# Patient Record
Sex: FEMALE | Race: WHITE | NOT HISPANIC OR LATINO | Employment: UNEMPLOYED | URBAN - METROPOLITAN AREA
[De-identification: names, ages, dates, MRNs, and addresses within clinical notes are randomized per-mention and may not be internally consistent; named-entity substitution may affect disease eponyms.]

---

## 2017-01-17 ENCOUNTER — TRANSCRIBE ORDERS (OUTPATIENT)
Dept: ADMINISTRATIVE | Facility: HOSPITAL | Age: 62
End: 2017-01-17

## 2017-01-17 ENCOUNTER — HOSPITAL ENCOUNTER (OUTPATIENT)
Dept: RADIOLOGY | Facility: CLINIC | Age: 62
Discharge: HOME | End: 2017-01-17
Payer: OTHER GOVERNMENT

## 2017-01-17 DIAGNOSIS — M54.42 ACUTE BACK PAIN WITH SCIATICA, LEFT: ICD-10-CM

## 2017-01-17 DIAGNOSIS — M54.42 ACUTE BACK PAIN WITH SCIATICA, LEFT: Primary | ICD-10-CM

## 2017-01-17 PROCEDURE — 72190 X-RAY EXAM OF PELVIS: CPT

## 2017-05-05 ENCOUNTER — TRANSCRIBE ORDERS (OUTPATIENT)
Dept: ADMINISTRATIVE | Facility: HOSPITAL | Age: 62
End: 2017-05-05

## 2017-05-05 DIAGNOSIS — I71.2 THORACIC AORTIC ANEURYSM WITHOUT RUPTURE (HCC): Primary | ICD-10-CM

## 2017-05-25 ENCOUNTER — TRANSCRIBE ORDERS (OUTPATIENT)
Dept: ADMINISTRATIVE | Facility: HOSPITAL | Age: 62
End: 2017-05-25

## 2017-05-25 DIAGNOSIS — K80.30 CALCULUS OF BILE DUCT WITH CHOLANGITIS WITHOUT OBSTRUCTION, UNSPECIFIED CHOLANGITIS ACUITY: Primary | ICD-10-CM

## 2017-06-13 ENCOUNTER — HOSPITAL ENCOUNTER (OUTPATIENT)
Dept: CT IMAGING | Facility: HOSPITAL | Age: 62
Discharge: HOME | End: 2017-06-13
Payer: OTHER GOVERNMENT

## 2017-06-13 DIAGNOSIS — I71.2 THORACIC AORTIC ANEURYSM WITHOUT RUPTURE (HCC): ICD-10-CM

## 2017-06-13 DIAGNOSIS — K80.30 CALCULUS OF BILE DUCT WITH CHOLANGITIS WITHOUT OBSTRUCTION, UNSPECIFIED CHOLANGITIS ACUITY: ICD-10-CM

## 2017-06-13 PROCEDURE — 74160 CT ABDOMEN W/CONTRAST: CPT

## 2017-06-13 PROCEDURE — 71275 CT ANGIOGRAPHY CHEST: CPT

## 2017-06-13 RX ADMIN — IOHEXOL 100 ML: 350 INJECTION, SOLUTION INTRAVENOUS at 09:01

## 2017-06-20 ENCOUNTER — TRANSCRIBE ORDERS (OUTPATIENT)
Dept: ADMINISTRATIVE | Facility: HOSPITAL | Age: 62
End: 2017-06-20

## 2017-06-20 DIAGNOSIS — K80.50 CALCULUS OF BILE DUCT WITHOUT MENTION OF CHOLECYSTITIS OR OBSTRUCTION: Primary | ICD-10-CM

## 2017-06-26 ENCOUNTER — HOSPITAL ENCOUNTER (OUTPATIENT)
Dept: MRI IMAGING | Facility: HOSPITAL | Age: 62
Discharge: HOME | End: 2017-06-26
Attending: FAMILY MEDICINE
Payer: OTHER GOVERNMENT

## 2017-06-26 DIAGNOSIS — K80.50 CALCULUS OF BILE DUCT WITHOUT MENTION OF CHOLECYSTITIS OR OBSTRUCTION: ICD-10-CM

## 2017-06-26 PROCEDURE — 74181 MRI ABDOMEN W/O CONTRAST: CPT

## 2017-06-26 PROCEDURE — 76376 3D RENDER W/INTRP POSTPROCES: CPT

## 2017-11-17 ENCOUNTER — TRANSCRIBE ORDERS (OUTPATIENT)
Dept: ADMINISTRATIVE | Facility: HOSPITAL | Age: 62
End: 2017-11-17

## 2017-11-17 DIAGNOSIS — Z78.0 MENOPAUSE: Primary | ICD-10-CM

## 2017-12-08 ENCOUNTER — HOSPITAL ENCOUNTER (OUTPATIENT)
Dept: BONE DENSITY | Facility: IMAGING CENTER | Age: 62
Discharge: HOME | End: 2017-12-08
Payer: OTHER GOVERNMENT

## 2017-12-08 ENCOUNTER — GENERIC CONVERSION - ENCOUNTER (OUTPATIENT)
Dept: OTHER | Facility: OTHER | Age: 62
End: 2017-12-08

## 2017-12-08 DIAGNOSIS — Z78.0 MENOPAUSE: ICD-10-CM

## 2017-12-08 PROCEDURE — 77080 DXA BONE DENSITY AXIAL: CPT

## 2019-01-22 ENCOUNTER — OFFICE VISIT (OUTPATIENT)
Dept: CARDIOLOGY CLINIC | Facility: CLINIC | Age: 64
End: 2019-01-22
Payer: OTHER GOVERNMENT

## 2019-01-22 VITALS
SYSTOLIC BLOOD PRESSURE: 112 MMHG | HEIGHT: 64 IN | BODY MASS INDEX: 23.52 KG/M2 | DIASTOLIC BLOOD PRESSURE: 62 MMHG | WEIGHT: 137.8 LBS | HEART RATE: 68 BPM

## 2019-01-22 DIAGNOSIS — R06.00 DYSPNEA, UNSPECIFIED TYPE: ICD-10-CM

## 2019-01-22 DIAGNOSIS — R00.2 PALPITATIONS: Primary | ICD-10-CM

## 2019-01-22 DIAGNOSIS — E78.5 DYSLIPIDEMIA: ICD-10-CM

## 2019-01-22 DIAGNOSIS — I71.2 THORACIC AORTIC ANEURYSM WITHOUT RUPTURE (HCC): ICD-10-CM

## 2019-01-22 PROCEDURE — 99244 OFF/OP CNSLTJ NEW/EST MOD 40: CPT | Performed by: INTERNAL MEDICINE

## 2019-01-22 PROCEDURE — 93000 ELECTROCARDIOGRAM COMPLETE: CPT | Performed by: INTERNAL MEDICINE

## 2019-01-22 RX ORDER — ASPIRIN 81 MG/1
81 TABLET, CHEWABLE ORAL DAILY
COMMUNITY

## 2019-01-22 RX ORDER — ALENDRONATE SODIUM 70 MG/1
70 TABLET ORAL WEEKLY
COMMUNITY

## 2019-01-22 RX ORDER — PRAVASTATIN SODIUM 10 MG
10 TABLET ORAL DAILY
COMMUNITY
End: 2019-02-26 | Stop reason: SDUPTHER

## 2019-01-22 NOTE — PROGRESS NOTES
Consultation - Cardiology   Mari Naidu 66 y o  female MRN: [id number]    Encounter: [id number]    Assessment/Plan     Assessment and Plan:     Thoracic Aortic Aneurysm: She is asymptomatic  Last measurement was 4 2 cm  Will do TAAD genetic testing given family history  Palpitations Minimal flutters  Will monitor  Shortness of breath: This has been stable  Her resting EKG is normal  WIll update echocardiogram      Dyslipidemia: Continue pravastatin  Lipids with PCP  History of Present Illness   Physician Requesting Consult: No att  providers found  Reason for Consult / Principal Problem: Aortic Aneurysm  HPI: Mari Naidu is a 66y o  year old female who presents as a new patient  66year old female here to establish care with history of aortic aneurym  She had one brother  from an aortic dissection at age 45  She has mild dyslipidemia  She was treated for breast cancer in the past      She does have some exertional shortness of breath  She is out of breath one flight of stairs  No chest pain, occasional flutters, no orthopnea, no PND and no edema  No sustained palpitations  Review of Systems   Constitution: Negative  HENT: Negative  Eyes: Negative  Cardiovascular: Positive for irregular heartbeat  Respiratory: Negative  Endocrine: Negative  Hematologic/Lymphatic: Negative  Skin: Negative  Musculoskeletal: Negative  Gastrointestinal: Negative  Genitourinary: Negative  Neurological: Negative  Psychiatric/Behavioral: Negative  Allergic/Immunologic: Negative          Historical Information   Past Medical History:   Diagnosis Date    Cancer [facility])     breast cancer with double mastectomy    Limb alert care status     RESTRICTED LEFT ARM USE DUE TO BREAST CANCER    Pancreatitis      Past Surgical History:   Procedure Laterality Date    BREAST BIOPSY      BREAST RECONSTRUCTION Bilateral     MASTECTOMY, RADICAL Bilateral        Social History:  History   Alcohol Use No     History   Drug Use No     History   Smoking Status    Never Smoker   Smokeless Tobacco    Not on file       Family History:   No family history on file  Meds/Allergies   Allergies   Allergen Reactions    Penicillins Hives    Sulfa Antibiotics Hives       Current Outpatient Prescriptions:     alendronate (FOSAMAX) 70 mg tablet, Take 70 mg by mouth Once a week, Disp: , Rfl:     aspirin 81 mg chewable tablet, Chew 81 mg daily, Disp: , Rfl:     Biotin 5000 MCG CAPS, Take 1 capsule by mouth daily, Disp: , Rfl:     Bisacodyl (DULCOLAX PO), Take 1 tablet by mouth as needed, Disp: , Rfl:     pravastatin (PRAVACHOL) 10 mg tablet, Take 10 mg by mouth daily, Disp: , Rfl:     Vitals:   Pulse: 68  Blood Pressue: 112/62  Weight: 62 5 kg (137 lb 12 8 oz)      Physical Exam   Constitutional: She is oriented to person, place, and time  No distress  HENT:   Mouth/Throat: No oropharyngeal exudate  Eyes: No scleral icterus  Neck: No JVD present  Cardiovascular: Normal rate and regular rhythm  No murmur heard  Pulmonary/Chest: Effort normal and breath sounds normal  No respiratory distress  She has no wheezes  She has no rales  Abdominal: Soft  Bowel sounds are normal  She exhibits no distension  There is no tenderness  There is no rebound  Musculoskeletal: She exhibits no edema  Neurological: She is alert and oriented to person, place, and time  Skin: Skin is warm and dry  She is not diaphoretic  Psychiatric: She has a normal mood and affect   Her behavior is normal        [unfilled]    Invasive Devices          No matching active lines, drains, or airways          Lab Results   Component Value Date     05/24/2016    CO2 30 05/24/2016    BUN 18 05/24/2016    CREATININE 0 50 (L) 05/24/2016    EGFR >60 0 05/24/2016    CALCIUM 9 1 05/24/2016    AST 14 05/24/2016    ALT 20 05/24/2016    ALKPHOS 62 05/24/2016     Lab Results   Component Value Date    WBC 6 10 05/24/2016    HGB 13 4 05/24/2016     05/24/2016     No components found for: TROP    Imaging:     EKG: NSR  Normal ECG  Counseling / Coordination of Care  Total floor / unit time spent today 45 minutes  Greater than 50% of total time was spent with the patient and / or family counseling and / or coordination of care  A description of the counseling / coordination of care

## 2019-02-26 DIAGNOSIS — E78.5 DYSLIPIDEMIA: Primary | ICD-10-CM

## 2019-02-26 RX ORDER — PRAVASTATIN SODIUM 10 MG
10 TABLET ORAL DAILY
Qty: 90 TABLET | Refills: 1 | Status: SHIPPED | OUTPATIENT
Start: 2019-02-26 | End: 2019-05-23 | Stop reason: SDUPTHER

## 2019-02-26 NOTE — TELEPHONE ENCOUNTER
Request refill of pravastatin to [facility]  Last OV 1/22/19, f/u 6 mo

## 2019-03-01 ENCOUNTER — TELEPHONE (OUTPATIENT)
Dept: CARDIOLOGY CLINIC | Facility: CLINIC | Age: 64
End: 2019-03-01

## 2019-05-23 DIAGNOSIS — E78.5 DYSLIPIDEMIA: ICD-10-CM

## 2019-05-24 RX ORDER — PRAVASTATIN SODIUM 10 MG
10 TABLET ORAL DAILY
Qty: 90 TABLET | Refills: 2 | Status: SHIPPED | OUTPATIENT
Start: 2019-05-24 | End: 2020-05-27 | Stop reason: SDUPTHER

## 2019-06-11 ENCOUNTER — HOSPITAL ENCOUNTER (OUTPATIENT)
Dept: NON INVASIVE DIAGNOSTICS | Facility: CLINIC | Age: 64
Discharge: HOME | End: 2019-06-11
Payer: OTHER GOVERNMENT

## 2019-06-11 DIAGNOSIS — I71.2 THORACIC AORTIC ANEURYSM WITHOUT RUPTURE (HCC): ICD-10-CM

## 2019-06-11 PROCEDURE — 93306 TTE W/DOPPLER COMPLETE: CPT

## 2019-06-11 PROCEDURE — 93306 TTE W/DOPPLER COMPLETE: CPT | Performed by: INTERNAL MEDICINE

## 2019-06-13 ENCOUNTER — TELEPHONE (OUTPATIENT)
Dept: CARDIOLOGY CLINIC | Facility: CLINIC | Age: 64
End: 2019-06-13

## 2019-06-21 ENCOUNTER — TELEPHONE (OUTPATIENT)
Dept: CARDIOLOGY CLINIC | Facility: CLINIC | Age: 64
End: 2019-06-21

## 2019-08-15 ENCOUNTER — TELEPHONE (OUTPATIENT)
Dept: CARDIOLOGY CLINIC | Facility: CLINIC | Age: 64
End: 2019-08-15

## 2019-08-15 NOTE — TELEPHONE ENCOUNTER
Pt left VM on Medication refill line for refill on Pravastatin 10 mg  Pt requested that we fill prescription for longer period of time due to her having to refill too often  After reviewing pt chart 5/23/19 new script was electronically sent to [facility] for Pravastatin 10 mg daily 90 day supply 2 refills  This should take pt up to February, 23, 2020 before needing another prescription  Called pt back to inform of this information  Pt was instructed to call [address] is used and to have them send the next refill  And, to call office with any questions or concerns

## 2019-09-19 LAB
CHOLEST SERPL-MCNC: 199 MG/DL
CHOLEST/HDLC SERPL: 2.6 (CALC)
HDLC SERPL-MCNC: 78 MG/DL
LDLC SERPL CALC-MCNC: 106 MG/DL (CALC)
NONHDLC SERPL-MCNC: 121 MG/DL (CALC)
TRIGL SERPL-MCNC: 66 MG/DL

## 2019-09-24 ENCOUNTER — OFFICE VISIT (OUTPATIENT)
Dept: CARDIOLOGY CLINIC | Facility: CLINIC | Age: 64
End: 2019-09-24
Payer: OTHER GOVERNMENT

## 2019-09-24 VITALS
OXYGEN SATURATION: 98 % | HEART RATE: 78 BPM | SYSTOLIC BLOOD PRESSURE: 102 MMHG | WEIGHT: 130.6 LBS | DIASTOLIC BLOOD PRESSURE: 74 MMHG | HEIGHT: 64 IN | BODY MASS INDEX: 22.3 KG/M2

## 2019-09-24 DIAGNOSIS — R00.2 PALPITATIONS: Primary | ICD-10-CM

## 2019-09-24 DIAGNOSIS — Z01.818 PRE-OP EXAM: ICD-10-CM

## 2019-09-24 PROCEDURE — 93000 ELECTROCARDIOGRAM COMPLETE: CPT | Performed by: INTERNAL MEDICINE

## 2019-09-24 PROCEDURE — 99214 OFFICE O/P EST MOD 30 MIN: CPT | Performed by: INTERNAL MEDICINE

## 2019-09-24 NOTE — PROGRESS NOTES
Cardiology Follow Up    Khloe Oliva  1955  [id number]  [address] [facility] Reche Severin  [address] [place] [id number] [phone]    1  Palpitations  POCT ECG   2  Pre-op exam  POCT ECG       Interval History:     Followup for thoracic aortic aneurysm and preop cardiac clearance    She is going to have breast surgery revision  She has no chest pain, no dyspnea and no palpitations  She is feeling well  Echo reviewed  Ascending aorta was 42 mm  Past Medical History:   Diagnosis Date    Cancer [facility])     breast cancer with double mastectomy    Limb alert care status     RESTRICTED LEFT ARM USE DUE TO BREAST CANCER    Pancreatitis      Social History     Socioeconomic History    Marital status:       Spouse name: Not on file    Number of children: Not on file    Years of education: Not on file    Highest education level: Not on file   Occupational History    Not on file   Social Needs    Financial resource strain: Not on file    Food insecurity:     Worry: Not on file     Inability: Not on file    Transportation needs:     Medical: Not on file     Non-medical: Not on file   Tobacco Use    Smoking status: Never Smoker    Smokeless tobacco: Never Used   Substance and Sexual Activity    Alcohol use: No    Drug use: No    Sexual activity: Not on file   Lifestyle    Physical activity:     Days per week: Not on file     Minutes per session: Not on file    Stress: Not on file   Relationships    Social connections:     Talks on phone: Not on file     Gets together: Not on file     Attends Orthodox service: Not on file     Active member of club or organization: Not on file     Attends meetings of clubs or organizations: Not on file     Relationship status: Not on file    Intimate partner violence:     Fear of current or ex partner: Not on file     Emotionally abused: Not on file     Physically abused: Not on file     Forced sexual activity: Not on file   Other Topics Concern    Not on file   Social History Narrative    Not on file      History reviewed  No pertinent family history  Past Surgical History:   Procedure Laterality Date    BREAST BIOPSY      BREAST RECONSTRUCTION Bilateral     MASTECTOMY, RADICAL Bilateral        Current Outpatient Medications:     alendronate (FOSAMAX) 70 mg tablet, Take 70 mg by mouth Once a week, Disp: , Rfl:     aspirin 81 mg chewable tablet, Chew 81 mg daily, Disp: , Rfl:     Bisacodyl (DULCOLAX PO), Take 1 tablet by mouth as needed, Disp: , Rfl:     ROGELIO SEED PO, Take by mouth daily, Disp: , Rfl:     Multiple Vitamin (MULTI VITAMIN DAILY PO), Take 1 capsule by mouth, Disp: , Rfl:     pravastatin (PRAVACHOL) 10 mg tablet, Take 1 tablet (10 mg total) by mouth daily, Disp: 90 tablet, Rfl: 2    Biotin 5000 MCG CAPS, Take 1 capsule by mouth daily, Disp: , Rfl:   Allergies   Allergen Reactions    Penicillins Hives    Sulfa Antibiotics Hives       Labs:     Chemistry        Component Value Date/Time    K 3 8 05/24/2016 1011     05/24/2016 1011    CO2 30 05/24/2016 1011    BUN 18 05/24/2016 1011    CREATININE 0 50 (L) 05/24/2016 1011        Component Value Date/Time    CALCIUM 9 1 05/24/2016 1011    ALKPHOS 62 05/24/2016 1011    AST 14 05/24/2016 1011    ALT 20 05/24/2016 1011            No results found for: CHOL  Lab Results   Component Value Date    HDL 78 09/18/2019     No results found for: [facility]  Lab Results   Component Value Date    TRIG 66 09/18/2019     No results found for: CHOLHDL    Imaging: No results found  EKG: NSR  Normal ECG  Review of Systems   Constitution: Negative  HENT: Negative  Eyes: Negative  Cardiovascular: Negative  Respiratory: Negative  Endocrine: Negative  Hematologic/Lymphatic: Negative  Skin: Negative  Musculoskeletal: Negative  Gastrointestinal: Negative  Genitourinary: Negative      Neurological: Negative  Psychiatric/Behavioral: Negative  Allergic/Immunologic: Negative  Vitals:    09/24/19 0815   BP: 102/74   Pulse: 78   SpO2: 98%           Physical Exam   Constitutional: She is oriented to person, place, and time  No distress  HENT:   Mouth/Throat: No oropharyngeal exudate  Eyes: No scleral icterus  Neck: No JVD present  Cardiovascular: Normal rate and regular rhythm  Exam reveals no friction rub  No murmur heard  Pulmonary/Chest: Effort normal and breath sounds normal  No stridor  No respiratory distress  She has no wheezes  Abdominal: Soft  Bowel sounds are normal  She exhibits no distension  There is no tenderness  There is no guarding  Musculoskeletal: She exhibits no edema or deformity  Neurological: She is alert and oriented to person, place, and time  Skin: Skin is warm and dry  She is not diaphoretic  Psychiatric: She has a normal mood and affect  Her behavior is normal        Discussion/Summary:    Thoracic Aortic Aneurysm: She is asymptomatic  Last measurement was 4 2 cm  Will do TAAD genetic testing given family history       Palpitations: She is doing well and asymptomatic       Shortness of breath: Resolved       Dyslipidemia: Continue pravastatin   and   Preop cardiac clearance: low risk for surgery  Resting EKG was normal           The patient was counseled regarding diagnostic results, instructions for management, risk factor reductions, impressions  total time of encounter was 25 minutes and 15 minutes was spent counseling

## 2019-09-25 ENCOUNTER — TELEPHONE (OUTPATIENT)
Dept: CARDIOLOGY CLINIC | Facility: CLINIC | Age: 64
End: 2019-09-25

## 2019-09-25 NOTE — TELEPHONE ENCOUNTER
Pt called - states her surgeon said it's OK to do procedure on ASA if she needs to stay on it but was wondering if it would be OK to hold? Surgery is scheduled for Monday

## 2019-09-25 NOTE — TELEPHONE ENCOUNTER
Pt returned call, message relayed as given and that surgeon should tell her when she can restart ASA  No further questions at this time

## 2020-01-31 ENCOUNTER — TELEPHONE (OUTPATIENT)
Dept: CARDIOLOGY CLINIC | Facility: CLINIC | Age: 65
End: 2020-01-31

## 2020-01-31 NOTE — TELEPHONE ENCOUNTER
----- Message from Marlon Maldonado MD sent at 1/16/2020  3:14 PM EST -----  Regarding: RE: test cost      So does this need a prior auth with the insurance? And can we figure out what her out of pocket cost would be first and see if she still wants to do this?       ----- Message -----  From: Fe Solorzano MA  Sent: 1/16/2020   3:11 PM EST  To: Marlon Maldonado MD  Subject: test cost                                        Per lab, ins will be billed appx $9,900  If her out of pocket is over $100 the reference lab will contact pt about payment options  There's pre-auth paperwork here that will need to be filled out    ~B

## 2020-01-31 NOTE — TELEPHONE ENCOUNTER
Called pt - reviewed info re: potential cost    Gave her test name as listed on testing sheet provided by [facility] for outside lab  Pt will check coverage with her ins co and call back if she would like to proceed with testing

## 2020-05-27 DIAGNOSIS — E78.5 DYSLIPIDEMIA: ICD-10-CM

## 2020-05-27 RX ORDER — PRAVASTATIN SODIUM 10 MG
10 TABLET ORAL DAILY
Qty: 90 TABLET | Refills: 1 | Status: SHIPPED | OUTPATIENT
Start: 2020-05-27 | End: 2020-11-24 | Stop reason: SDUPTHER

## 2020-06-11 ENCOUNTER — TELEPHONE (OUTPATIENT)
Dept: GASTROENTEROLOGY | Facility: CLINIC | Age: 65
End: 2020-06-11

## 2020-07-02 RX ORDER — SODIUM CHLORIDE 9 MG/ML
75 INJECTION, SOLUTION INTRAVENOUS CONTINUOUS
Status: CANCELLED | OUTPATIENT
Start: 2020-07-02

## 2020-07-24 ENCOUNTER — OFFICE VISIT (OUTPATIENT)
Dept: CARDIOLOGY CLINIC | Facility: CLINIC | Age: 65
End: 2020-07-24
Payer: OTHER GOVERNMENT

## 2020-07-24 ENCOUNTER — TRANSCRIBE ORDERS (OUTPATIENT)
Dept: ADMINISTRATIVE | Facility: HOSPITAL | Age: 65
End: 2020-07-24

## 2020-07-24 ENCOUNTER — APPOINTMENT (OUTPATIENT)
Dept: RADIOLOGY | Facility: CLINIC | Age: 65
End: 2020-07-24
Payer: OTHER GOVERNMENT

## 2020-07-24 VITALS
HEIGHT: 64 IN | HEART RATE: 70 BPM | BODY MASS INDEX: 22.02 KG/M2 | DIASTOLIC BLOOD PRESSURE: 62 MMHG | SYSTOLIC BLOOD PRESSURE: 100 MMHG | TEMPERATURE: 98.7 F | WEIGHT: 129 LBS

## 2020-07-24 DIAGNOSIS — G43.101 MIGRAINE WITH AURA AND WITH STATUS MIGRAINOSUS, NOT INTRACTABLE: Primary | ICD-10-CM

## 2020-07-24 DIAGNOSIS — I71.2 THORACIC AORTIC ANEURYSM WITHOUT RUPTURE (HCC): ICD-10-CM

## 2020-07-24 DIAGNOSIS — R00.2 PALPITATIONS: Primary | ICD-10-CM

## 2020-07-24 DIAGNOSIS — M54.2 CERVICALGIA: ICD-10-CM

## 2020-07-24 PROCEDURE — 72050 X-RAY EXAM NECK SPINE 4/5VWS: CPT

## 2020-07-24 PROCEDURE — 93000 ELECTROCARDIOGRAM COMPLETE: CPT | Performed by: INTERNAL MEDICINE

## 2020-07-24 PROCEDURE — 99214 OFFICE O/P EST MOD 30 MIN: CPT | Performed by: INTERNAL MEDICINE

## 2020-07-24 RX ORDER — MULTIVIT-MIN/IRON/FOLIC ACID/K 18-600-40
1 CAPSULE ORAL DAILY
COMMUNITY
End: 2022-01-31

## 2020-07-24 NOTE — PROGRESS NOTES
Cardiology Follow Up    [facility] Michel  1955  [id number]  [facility] [address]  [phone]    1  Palpitations  POCT ECG       Interval History: Followup Thoracic aneurysm    Doing well  No chest pain, no dyspnea and no palpitations  Past Medical History:   Diagnosis Date    Cancer [facility])     breast cancer with double mastectomy    Limb alert care status     RESTRICTED LEFT ARM USE DUE TO BREAST CANCER    Pancreatitis      Social History     Socioeconomic History    Marital status:      Spouse name: Not on file    Number of children: Not on file    Years of education: Not on file    Highest education level: Not on file   Occupational History    Not on file   Social Needs    Financial resource strain: Not on file    Food insecurity:     Worry: Not on file     Inability: Not on file    Transportation needs:     Medical: Not on file     Non-medical: Not on file   Tobacco Use    Smoking status: Never Smoker    Smokeless tobacco: Never Used   Substance and Sexual Activity    Alcohol use: No    Drug use: No    Sexual activity: Not on file   Lifestyle    Physical activity:     Days per week: Not on file     Minutes per session: Not on file    Stress: Not on file   Relationships    Social connections:     Talks on phone: Not on file     Gets together: Not on file     Attends Church service: Not on file     Active member of club or organization: Not on file     Attends meetings of clubs or organizations: Not on file     Relationship status: Not on file    Intimate partner violence:     Fear of current or ex partner: Not on file     Emotionally abused: Not on file     Physically abused: Not on file     Forced sexual activity: Not on file   Other Topics Concern    Not on file   Social History Narrative    Not on file      No family history on file    Past Surgical History:   Procedure Laterality Date    BREAST BIOPSY      BREAST RECONSTRUCTION Bilateral     MASTECTOMY, RADICAL Bilateral        Current Outpatient Medications:     alendronate (FOSAMAX) 70 mg tablet, Take 70 mg by mouth Once a week, Disp: , Rfl:     aspirin 81 mg chewable tablet, Chew 81 mg daily, Disp: , Rfl:     Bisacodyl (DULCOLAX PO), Take 1 tablet by mouth as needed, Disp: , Rfl:     ROGELIO SEED PO, Take by mouth daily, Disp: , Rfl:     Multiple Vitamin (MULTI VITAMIN DAILY PO), Take 1 capsule by mouth, Disp: , Rfl:     pravastatin (PRAVACHOL) 10 mg tablet, Take 1 tablet (10 mg total) by mouth daily, Disp: 90 tablet, Rfl: 1    Vitamin D, Cholecalciferol, 50 MCG (2000 UT) CAPS, Take 1 capsule by mouth daily, Disp: , Rfl:   Allergies   Allergen Reactions    Ofloxacin      Other reaction(s): severe dizziness    Penicillins Hives    Sulfa Antibiotics Hives       Labs:     Chemistry        Component Value Date/Time    K 3 8 05/24/2016 1011     05/24/2016 1011    CO2 30 05/24/2016 1011    BUN 18 05/24/2016 1011    CREATININE 0 50 (L) 05/24/2016 1011        Component Value Date/Time    CALCIUM 9 1 05/24/2016 1011    ALKPHOS 62 05/24/2016 1011    AST 14 05/24/2016 1011    ALT 20 05/24/2016 1011            No results found for: CHOL  Lab Results   Component Value Date    HDL 78 09/18/2019     Lab Results   Component Value Date    LDLCALC 106 (H) 09/18/2019     Lab Results   Component Value Date    TRIG 66 09/18/2019     No results found for: CHOLHDL    Imaging: No results found  EKG: NSr Normal ECG    ROS    Vitals:    07/24/20 0828   BP: 100/62   Pulse: 70   Temp: 98 7 °F (37 1 °C)           Physical Exam    Discussion/Summary:    Thoracic Aortic Aneurysm: She is asymptomatic  Last measurement was 4 2 cm  TAAD testing had high price tag  Will update CT chest       Palpitations: She is doing well and asymptomatic         Dyslipidemia: Continue pravastatin    and        She is fine to have a colonoscopy  The patient was counseled regarding diagnostic results, instructions for management, risk factor reductions, impressions  total time of encounter was 25 minutes and 15 minutes was spent counseling

## 2020-07-27 ENCOUNTER — EVALUATION (OUTPATIENT)
Dept: PHYSICAL THERAPY | Facility: CLINIC | Age: 65
End: 2020-07-27
Payer: OTHER GOVERNMENT

## 2020-07-27 ENCOUNTER — TRANSCRIBE ORDERS (OUTPATIENT)
Dept: PHYSICAL THERAPY | Facility: CLINIC | Age: 65
End: 2020-07-27

## 2020-07-27 DIAGNOSIS — R42 DIZZINESS: ICD-10-CM

## 2020-07-27 DIAGNOSIS — H81.11 BPPV (BENIGN PAROXYSMAL POSITIONAL VERTIGO), RIGHT: Primary | ICD-10-CM

## 2020-07-27 PROCEDURE — 97140 MANUAL THERAPY 1/> REGIONS: CPT | Performed by: PHYSICAL THERAPIST

## 2020-07-27 PROCEDURE — 97161 PT EVAL LOW COMPLEX 20 MIN: CPT | Performed by: PHYSICAL THERAPIST

## 2020-07-27 NOTE — PROGRESS NOTES
PT Evaluation     Today's date: 2020  Patient name: Branden Richardson  : 1955  MRN: [id number]  Referring provider: Maribeth Willingham DO  Dx:   Encounter Diagnosis     ICD-10-CM    1  BPPV (benign paroxysmal positional vertigo), right H81 11    2  Dizziness R42        Start Time: 46  Stop Time: 0830  Total time in clinic (min): 45 minutes    Assessment/Plan  Branden Richardson is a 60 y o  female who was referred to physical therapy for management of dizziness secondary to R Posterior BPPV  Primary impairments include onset of dizziness with positional changes and imbalance  Consequently, patient has difficulty completing ADLs including bed transitions  Korin Ripple would benefit from skilled intervention to address all deficits and improve functional capability  Patient is a good candidate for therapy, pending compliance with HEP and 2x weekly participation  Thank you for the referral and please do not hesitate to contact me with any questions or concerns regarding [facility]! Plan  Frequency:2x per week   Duration in visits: 8  Duration in weeks: 4   Therapeutic exercise/activity, neuromuscular reeducation, manual therapy, and modalities  Patient understands and agrees to plan of care  Goals  Short Term--4 weeks  1  2 point decrease in dizziness sx  2  Negative BPPV testing  Long Term--By Discharge  1  Patient will achieve expected FOTO score  2  Patient will perform all ADLs without limitation or onset of dizziness  Patient's Goal: Resolve dizziness  Subjective  History   Date of Onset: One week ago  Description: Patient reports onset of severe "room spinning" dizziness and "throbbing" in the posterior aspect of her head/neck  This was exacerbated with lying down, rolling R worse than L, and sitting up  Sx typically last for 1 minute  She initially had balance deficits and had difficulty with accuracy when sitting down (I e  Missing the chair/toilet seat)    This has improved drastically over the past few days  She denies any sudden hearing loss (does wear hearing aids) or recent tinnitus (reports years long history of "white noise in both ears when she lies down)  She also denies loss of taste/smell or numbness in the face  She reports history of migraine attacks (last attack ~10 years ago) that was accompanied by facial numbness, dizziness, and imbalance  However, these typically only last a few days and otherwise she denies weekly/monthly HA  Dizziness at best: 0/10  Pain at worst: 8/10    Social History  Cady lives indpendently in a multistory home  Patient is a not employed  Objective    Flowsheet Rows      Most Recent Value   PT/OT G-Codes   Current Score  21   Projected Score  73         Cervical % of normal   Flex  100   Extn  100   SB Left 75   SB Right 75   ROT Left 100   ROT Right 100       Head positioning: forward head, rounded shoulders    Visual Testing:  Horizontal Saccades: normal   Vertical Saccades: normal    VORx1: normal    VOR cancellation: neg    Smooth Pursuit: Horizontal: normal      Vertical: normal   Head Thrust:  Left: neg      Right: neg    Balance:  Feet Together EO: 30 sec  Feet Together EC: 30 sec  Feet together EO on foam:  30 sec  Feet together EC on foam:  30 sec  SLS RLE: 30 sec  SLS LLE: 30 sec      BPPV Testing:  Roll test: neg R/L  Brooke-Hallpike L:  Negative for nystagmus/sx provocation  Brooke-Hallpike R:  Pos for nystagmus ~30 sec/sx provocation  (fully cleared after one pass Epley)              Precautions: thoracic aneurysm (diagnosed 5 years ago and is just being monitored)       Manuals 7/27            R Epley  x2                                                   Neuro Re-Ed                                                                                                        Ther Ex             Patient education 8' Ther Activity                                       Gait Training                                       Modalities

## 2020-07-27 NOTE — LETTER
2020    Angus Molina DO  [address] [facility] [address] [facility] [address] [facility] [address]    Patient: Gary Herrera   YOB: 1955   Date of Visit: 2020     Encounter Diagnosis     ICD-10-CM    1  BPPV (benign paroxysmal positional vertigo), right H81 11    2  Ben Loop        Dear Dr Glenis Smith:    Thank you for your recent referral of Gary Herrera  Please review the attached evaluation summary from Cady's recent visit  Please verify that you agree with the plan of care by signing the attached order  If you have any questions or concerns, please do not hesitate to call  I sincerely appreciate the opportunity to share in the care of one of your patients and hope to have another opportunity to work with you in the near future  Sincerely,    Madhavi Rajput, PT      Referring Provider:      I certify that I have read the below Plan of Care and certify the need for these services furnished under this plan of treatment while under my care  Angus Molina DO  [address] [place] [address] [address]: [phone]          PT Evaluation     Today's date: 2020  Patient name: Gary Herrera  : 1955  MRN: [id number]  Referring provider: Shira Elizalde DO  Dx:   Encounter Diagnosis     ICD-10-CM    1  BPPV (benign paroxysmal positional vertigo), right H81 11    2  Dizziness R42        Start Time: 46  Stop Time: 0830  Total time in clinic (min): 45 minutes    Assessment/Plan  Gary Herrera is a 60 y o  female who was referred to physical therapy for management of dizziness secondary to R Posterior BPPV  Primary impairments include onset of dizziness with positional changes and imbalance  Consequently, patient has difficulty completing ADLs including bed transitions  Bal Hector would benefit from skilled intervention to address all deficits and improve functional capability    Patient is a good candidate for therapy, pending compliance with HEP and 2x weekly participation  Thank you for the referral and please do not hesitate to contact me with any questions or concerns regarding Maty care! Plan  Frequency:2x per week   Duration in visits: 8  Duration in weeks: 4   Therapeutic exercise/activity, neuromuscular reeducation, manual therapy, and modalities  Patient understands and agrees to plan of care  Goals  Short Term--4 weeks  1  2 point decrease in dizziness sx  2  Negative BPPV testing  Long Term--By Discharge  1  Patient will achieve expected FOTO score  2  Patient will perform all ADLs without limitation or onset of dizziness  Patient's Goal: Resolve dizziness  Subjective  History   Date of Onset: One week ago  Description: Patient reports onset of severe "room spinning" dizziness and "throbbing" in the posterior aspect of her head/neck  This was exacerbated with lying down, rolling R worse than L, and sitting up  Sx typically last for 1 minute  She initially had balance deficits and had difficulty with accuracy when sitting down (I e  Missing the chair/toilet seat)  This has improved drastically over the past few days  She denies any sudden hearing loss (does wear hearing aids) or recent tinnitus (reports years long history of "white noise in both ears when she lies down)  She also denies loss of taste/smell or numbness in the face  She reports history of migraine attacks (last attack ~10 years ago) that was accompanied by facial numbness, dizziness, and imbalance  However, these typically only last a few days and otherwise she denies weekly/monthly HA  Dizziness at best: 0/10  Pain at worst: 8/10    Social History  Cady lives indpendently in a multistory home  Patient is a not employed  Objective    Flowsheet Rows      Most Recent Value   PT/OT G-Codes   Current Score  21   Projected Score  73         Cervical % of normal   Flex  100   Extn   100   SB Left 75 SB Right 75   ROT Left 100   ROT Right 100       Head positioning: forward head, rounded shoulders    Visual Testing:  Horizontal Saccades: normal   Vertical Saccades: normal    VORx1: normal    VOR cancellation: neg    Smooth Pursuit: Horizontal: normal      Vertical: normal   Head Thrust:  Left: neg      Right: neg    Balance:  Feet Together EO: 30 sec  Feet Together EC: 30 sec  Feet together EO on foam:  30 sec  Feet together EC on foam:  30 sec  SLS RLE: 30 sec  SLS LLE: 30 sec      BPPV Testing:  Roll test: neg R/L  Sunset-Hallpike L:  Negative for nystagmus/sx provocation  Sunset-Hallpike R:  Pos for nystagmus ~30 sec/sx provocation  (fully cleared after one pass Epley)              Precautions: thoracic aneurysm (diagnosed 5 years ago and is just being monitored)       Manuals 7/27            R Epley  x2                                                   Neuro Re-Ed                                                                                                        Ther Ex             Patient education 8'                                                                                                       Ther Activity                                       Gait Training                                       Modalities

## 2020-07-28 ENCOUNTER — APPOINTMENT (OUTPATIENT)
Dept: LAB | Facility: HOSPITAL | Age: 65
End: 2020-07-28
Attending: INTERNAL MEDICINE
Payer: OTHER GOVERNMENT

## 2020-07-28 ENCOUNTER — TRANSCRIBE ORDERS (OUTPATIENT)
Dept: ADMINISTRATIVE | Facility: HOSPITAL | Age: 65
End: 2020-07-28

## 2020-07-28 DIAGNOSIS — K83.8 OTHER SPECIFIED DISEASES OF BILIARY TRACT: Primary | ICD-10-CM

## 2020-07-28 LAB
ANION GAP SERPL CALCULATED.3IONS-SCNC: 7 MMOL/L (ref 4–13)
BUN SERPL-MCNC: 21 MG/DL (ref 5–25)
CALCIUM SERPL-MCNC: 9.2 MG/DL (ref 8.3–10.1)
CHLORIDE SERPL-SCNC: 104 MMOL/L (ref 100–108)
CO2 SERPL-SCNC: 30 MMOL/L (ref 21–32)
CREAT SERPL-MCNC: 0.66 MG/DL (ref 0.6–1.3)
GFR SERPL CREATININE-BSD FRML MDRD: 95 ML/MIN/1.73SQ M
GLUCOSE P FAST SERPL-MCNC: 96 MG/DL (ref 65–99)
POTASSIUM SERPL-SCNC: 3.7 MMOL/L (ref 3.5–5.3)
SODIUM SERPL-SCNC: 141 MMOL/L (ref 136–145)

## 2020-07-28 PROCEDURE — 80048 BASIC METABOLIC PNL TOTAL CA: CPT | Performed by: INTERNAL MEDICINE

## 2020-07-28 PROCEDURE — 36415 COLL VENOUS BLD VENIPUNCTURE: CPT | Performed by: INTERNAL MEDICINE

## 2020-07-29 ENCOUNTER — HOSPITAL ENCOUNTER (OUTPATIENT)
Dept: GASTROENTEROLOGY | Facility: HOSPITAL | Age: 65
Setting detail: OUTPATIENT SURGERY
Discharge: HOME | End: 2020-07-29
Attending: INTERNAL MEDICINE

## 2020-07-30 ENCOUNTER — OFFICE VISIT (OUTPATIENT)
Dept: PHYSICAL THERAPY | Facility: CLINIC | Age: 65
End: 2020-07-30
Payer: OTHER GOVERNMENT

## 2020-07-30 DIAGNOSIS — H81.11 BPPV (BENIGN PAROXYSMAL POSITIONAL VERTIGO), RIGHT: Primary | ICD-10-CM

## 2020-07-30 DIAGNOSIS — R42 DIZZINESS: ICD-10-CM

## 2020-07-30 PROCEDURE — 97112 NEUROMUSCULAR REEDUCATION: CPT | Performed by: PHYSICAL THERAPIST

## 2020-07-30 NOTE — PROGRESS NOTES
Daily Note + D/C    Today's date: 2020  Patient name: Katina Soriano  : 1955  MRN: [id number]  Referring provider: Maile Liriano DO  Dx:   Encounter Diagnosis     ICD-10-CM    1  BPPV (benign paroxysmal positional vertigo), right H81 11    2  Dizziness R42                   Subjective:  Pt reports feeling much better  No dizziness for the past 2 days  Objective:  See treatment diary below      Assessment:  Katina Soriano is a 60 y o  female who was referred to physical therapy for management of dizziness secondary to R Posterior BPPV  Primary impairments include onset of dizziness with positional changes and imbalance  Consequently, patient has difficulty completing ADLs including bed transitions  Julio Bowden would benefit from skilled intervention to address all deficits and improve functional capability  Patient is a good candidate for therapy, pending compliance with HEP and 2x weekly participation  She had a completely normal Health[employer] You Stepping test today  Did well with al new postural and vestibular ex today  Plan:  PT prn           Precautions: thoracic aneurysm (diagnosed 5 years ago and is just being monitored)       Manuals            R Epley  x2                                                   Neuro Re-Ed             Melba Fuentes  5 B           Standing against wall B UE OH slides  20           B t-band rows   Plum 20                                                               Ther Ex             Patient education 8'            Doorway pec stretch  15" 3                                                                                         Ther Activity                                       Gait Training                                       Modalities

## 2020-08-03 ENCOUNTER — HOSPITAL ENCOUNTER (OUTPATIENT)
Dept: MRI IMAGING | Facility: HOSPITAL | Age: 65
Discharge: HOME | End: 2020-08-03
Attending: FAMILY MEDICINE
Payer: OTHER GOVERNMENT

## 2020-08-03 ENCOUNTER — HOSPITAL ENCOUNTER (OUTPATIENT)
Dept: CT IMAGING | Facility: HOSPITAL | Age: 65
Discharge: HOME | End: 2020-08-03
Attending: INTERNAL MEDICINE
Payer: OTHER GOVERNMENT

## 2020-08-03 DIAGNOSIS — I71.2 THORACIC AORTIC ANEURYSM WITHOUT RUPTURE (HCC): ICD-10-CM

## 2020-08-03 DIAGNOSIS — G43.101 MIGRAINE WITH AURA AND WITH STATUS MIGRAINOSUS, NOT INTRACTABLE: ICD-10-CM

## 2020-08-03 PROCEDURE — 70551 MRI BRAIN STEM W/O DYE: CPT

## 2020-08-03 PROCEDURE — 71275 CT ANGIOGRAPHY CHEST: CPT

## 2020-08-03 RX ADMIN — IOHEXOL 90 ML: 350 INJECTION, SOLUTION INTRAVENOUS at 15:02

## 2020-08-06 ENCOUNTER — HOSPITAL ENCOUNTER (OUTPATIENT)
Dept: MRI IMAGING | Facility: HOSPITAL | Age: 65
Discharge: HOME | End: 2020-08-06
Attending: FAMILY MEDICINE
Payer: OTHER GOVERNMENT

## 2020-08-06 DIAGNOSIS — K83.8 OTHER SPECIFIED DISEASES OF BILIARY TRACT: ICD-10-CM

## 2020-08-06 PROCEDURE — 74181 MRI ABDOMEN W/O CONTRAST: CPT

## 2020-08-11 ENCOUNTER — TELEPHONE (OUTPATIENT)
Dept: CARDIOLOGY CLINIC | Facility: CLINIC | Age: 65
End: 2020-08-11

## 2020-08-11 NOTE — TELEPHONE ENCOUNTER
----- Message from Elvira Torrez MD sent at 8/10/2020 11:02 AM EDT -----  Stable size of her aortic aneurysm

## 2020-08-24 ENCOUNTER — EVALUATION (OUTPATIENT)
Dept: PHYSICAL THERAPY | Facility: CLINIC | Age: 65
End: 2020-08-24
Payer: OTHER GOVERNMENT

## 2020-08-24 ENCOUNTER — TRANSCRIBE ORDERS (OUTPATIENT)
Dept: PHYSICAL THERAPY | Facility: CLINIC | Age: 65
End: 2020-08-24

## 2020-08-24 DIAGNOSIS — H81.11 BPPV (BENIGN PAROXYSMAL POSITIONAL VERTIGO), RIGHT: Primary | ICD-10-CM

## 2020-08-24 DIAGNOSIS — R42 DIZZINESS: ICD-10-CM

## 2020-08-24 PROCEDURE — 97164 PT RE-EVAL EST PLAN CARE: CPT | Performed by: PHYSICAL THERAPIST

## 2020-08-24 PROCEDURE — 97140 MANUAL THERAPY 1/> REGIONS: CPT | Performed by: PHYSICAL THERAPIST

## 2020-08-24 NOTE — LETTER
2020    Dain Cerna DO  [address] [address]    Patient: Krzysztof Mora   YOB: 1955   Date of Visit: 2020     Encounter Diagnosis     ICD-10-CM    1  BPPV (benign paroxysmal positional vertigo), right  H81 11    2  Michael Lee        Dear Dr Lorri Maxwell:    Thank you for your recent referral of Krzysztof Mora  Please review the attached evaluation summary from Cady's recent visit  Please verify that you agree with the plan of care by signing the attached order  If you have any questions or concerns, please do not hesitate to call  I sincerely appreciate the opportunity to share in the care of one of your patients and hope to have another opportunity to work with you in the near future  Sincerely,    Dionicio Son, PT      Referring Provider:      I certify that I have read the below Plan of Care and certify the need for these services furnished under this plan of treatment while under my care  Dain Cerna DO  [address] [place] [address] St: [phone]          PT Re-Evaluation     Today's date: 2020  Patient name: Krzysztof Mora  : 1955  MRN: [id number]  Referring provider: Ascencion Hernandez DO  Dx:   Encounter Diagnosis     ICD-10-CM    1  BPPV (benign paroxysmal positional vertigo), right  H81 11    2  Dizziness  R42        Start Time: 1110  Stop Time: 1150  Total time in clinic (min): 40 minutes    Assessment  Assessment details: Krzysztof Mora is a 60 y o  female presenting to outpatient physical therapy at [place] [address] with complaints of dizziness  She presents with decreased range of motion, decreased postural strength, limited flexibility, poor postural awareness, poor balance, decreased tolerance to activity and decreased functional mobility due to BPPV (benign paroxysmal positional vertigo), right  (primary encounter diagnosis), dizziness    She was progressing well with Pt, but had another episode of dizziness on 20 when she was very congested  She seems to have issues only when congestion, stress and posture are all factors  She will continue to benefit from skilled PT services in order to address these deficits and reach maximum level of function  Thank you for the referral!  Impairments: abnormal gait, abnormal or restricted ROM, activity intolerance, impaired balance, impaired physical strength, lacks appropriate home exercise program and pain with function  Barriers to therapy: None  Understanding of Dx/Px/POC: excellent  Goals  ST  Independent with HEP in 2 weeks  2  Increase cervical AROM to WNL all motions in 3 weeks   3  Good postural awareness in 2 weeks    LT  Achieve FOTO score of 70/100 in 6 weeks   2  No cervical tightness in 6 weeks  3  Strength B traps = 5/5 in 6 weeks  4  Good cervical mobility in 6 weeks    Plan  Patient would benefit from: skilled PT  Planned therapy interventions: ADL retraining, balance/weight bearing training, flexibility, functional ROM exercises, home exercise program, joint mobilization, manual therapy, neuromuscular re-education, postural training, strengthening, stretching, therapeutic activities, therapeutic exercise and body mechanics training  Frequency: 2x week  Duration in weeks: 6  Treatment plan discussed with: patient        Subjective Evaluation    History of Present Illness  Mechanism of injury: On 20 pt reported onset of severe "room spinning" dizziness and "throbbing" in the posterior aspect of her head/neck  This was exacerbated with lying down, rolling R worse than L, and sitting up  Sx would last for about 1 minute  She initially had balance deficits and had difficulty with accuracy when sitting down (I e  Missing the chair/toilet seat)  This improved drastically after about 1 week    She denied any sudden hearing loss, but does wear hearing aids due to L>R hearing loss especially when feeling congested  She also denies loss of taste/smell or numbness in the face  She reports history of migraine attacks (last attack ~10 years ago) that was accompanied by facial numbness, dizziness, and imbalance  However, these typically only last a few days and otherwise she denies weekly/monthly GLASGOW  Was doing well since last in PT 3 weeks ago until 20 when she had another dizziness attack without cause  She feels that it may have been due to being very congested  States that when she takes mucinex her congestion resolves and is less dizzy  Recurrent probem    Quality of life: excellent    Pain  Current pain ratin  At best pain ratin  At worst pain ratin  Location: head dizziness  Progression: improved    Social Support    Employment status: not working  Treatments  Previous treatment: physical therapy and medication  Current treatment: medication  Patient Goals  Patient goals for therapy: improved balance and increased strength  Patient goal: no dizziness        Objective     Static Posture     Comments  VESTIBULAR EVALUATION    Posture:  Poor with mod forward head and rounded shoulders   Cervical ROM:  10% limitation in all motons  Palpation:   Mod tightness upper R cervical paraspinals and suboccipitals, min L  Balance:  Good B  VOR:  Normal  Nystagmus:  None  Fakuda Stepping Test:  Slight turn to R               POC EXPIRES On:  10/5/20  PRECAUTIONS:  None  CO-MORBIDITES:  Hearing loss L>R  PERSONAL FACTORS:  None      Manuals HEP            STM cervical paraspinals R>L upper > lower  15'           Rotational cervical mobs  5'           Suboccipital release  5'                        Neuro Re-Ed     T-band rows B             Supine chin tucks with B scap retraction             Doorway pec stretch                                                                 Ther Ex Ther Activity                              Gait Training                              Modalities

## 2020-08-24 NOTE — PROGRESS NOTES
PT Re-Evaluation + D/C    Today's date: 2020  Patient name: Howie Woodson  : 1955  MRN: [id number]  Referring provider: Farhana Santamaria DO  Dx:   Encounter Diagnosis     ICD-10-CM    1  BPPV (benign paroxysmal positional vertigo), right  H81 11    2  Dizziness  R42        Start Time: 1110  Stop Time: 1150  Total time in clinic (min): 40 minutes    Assessment  Assessment details: Howie Woodson is a 60 y o  female presenting to outpatient physical therapy at [place] [address] with complaints of dizziness  She presents with decreased range of motion, decreased postural strength, limited flexibility, poor postural awareness, poor balance, decreased tolerance to activity and decreased functional mobility due to BPPV (benign paroxysmal positional vertigo), right  (primary encounter diagnosis), dizziness  She was progressing well with Pt, but had another episode of dizziness on 20 when she was very congested  She seems to have issues only when congestion, stress and posture are all factors  She will continue to benefit from skilled PT services in order to address these deficits and reach maximum level of function  Thank you for the referral!  Impairments: abnormal gait, abnormal or restricted ROM, activity intolerance, impaired balance, impaired physical strength, lacks appropriate home exercise program and pain with function  Barriers to therapy: None  Understanding of Dx/Px/POC: excellent  Goals  ST  Independent with HEP in 2 weeks  2  Increase cervical AROM to WNL all motions in 3 weeks   3  Good postural awareness in 2 weeks    LT  Achieve FOTO score of 70/100 in 6 weeks   2  No cervical tightness in 6 weeks  3  Strength B traps = 5/5 in 6 weeks  4    Good cervical mobility in 6 weeks    Plan  Patient would benefit from: skilled PT  Planned therapy interventions: ADL retraining, balance/weight bearing training, flexibility, functional ROM exercises, home exercise program, joint mobilization, manual therapy, neuromuscular re-education, postural training, strengthening, stretching, therapeutic activities, therapeutic exercise and body mechanics training  Frequency: 2x week  Duration in weeks: 6  Treatment plan discussed with: patient        Subjective Evaluation    History of Present Illness  Mechanism of injury: On 20 pt reported onset of severe "room spinning" dizziness and "throbbing" in the posterior aspect of her head/neck  This was exacerbated with lying down, rolling R worse than L, and sitting up  Sx would last for about 1 minute  She initially had balance deficits and had difficulty with accuracy when sitting down (I e  Missing the chair/toilet seat)  This improved drastically after about 1 week  She denied any sudden hearing loss, but does wear hearing aids due to L>R hearing loss especially when feeling congested  She also denies loss of taste/smell or numbness in the face  She reports history of migraine attacks (last attack ~10 years ago) that was accompanied by facial numbness, dizziness, and imbalance  However, these typically only last a few days and otherwise she denies weekly/monthly GLASGOW  Was doing well since last in PT 3 weeks ago until 20 when she had another dizziness attack without cause  She feels that it may have been due to being very congested  States that when she takes mucinex her congestion resolves and is less dizzy               Recurrent probem    Quality of life: excellent    Pain  Current pain ratin  At best pain ratin  At worst pain ratin  Location: head dizziness  Progression: improved    Social Support    Employment status: not working  Treatments  Previous treatment: physical therapy and medication  Current treatment: medication  Patient Goals  Patient goals for therapy: improved balance and increased strength  Patient goal: no dizziness        Objective     Static Posture     Comments  VESTIBULAR EVALUATION    Posture: Poor with mod forward head and rounded shoulders   Cervical ROM:  10% limitation in all motons  Palpation:   Mod tightness upper R cervical paraspinals and suboccipitals, min L  Balance:  Good B  VOR:  Normal  Nystagmus:  None  Fakuda Stepping Test:  Slight turn to R               POC EXPIRES On:  10/5/20  PRECAUTIONS:  None  CO-MORBIDITES:  Hearing loss L>R  PERSONAL FACTORS:  None      Manuals HEP 8/24           STM cervical paraspinals R>L upper > lower  15'           Rotational cervical mobs  5'           Suboccipital release  5'                        Neuro Re-Ed     T-band rows B 8/24            Supine chin tucks with B scap retraction 8/24            Doorway pec stretch 8/24                                                                Ther Ex                                                                                                            Ther Activity                              Gait Training                              Modalities

## 2020-09-21 ENCOUNTER — TRANSCRIBE ORDERS (OUTPATIENT)
Dept: ADMINISTRATIVE | Facility: HOSPITAL | Age: 65
End: 2020-09-21

## 2020-09-21 DIAGNOSIS — Z13.820 ENCOUNTER FOR SCREENING FOR OSTEOPOROSIS: Primary | ICD-10-CM

## 2020-09-23 ENCOUNTER — CLINICAL SUPPORT (OUTPATIENT)
Dept: GASTROENTEROLOGY | Facility: CLINIC | Age: 65
End: 2020-09-23

## 2020-09-23 VITALS — HEIGHT: 64 IN | WEIGHT: 130 LBS | BODY MASS INDEX: 22.2 KG/M2

## 2020-09-23 DIAGNOSIS — Z86.010 HX OF COLONIC POLYPS: Primary | ICD-10-CM

## 2020-09-23 RX ORDER — SODIUM PICOSULFATE, MAGNESIUM OXIDE, AND ANHYDROUS CITRIC ACID 10; 3.5; 12 MG/160ML; G/160ML; G/160ML
LIQUID ORAL
Qty: 2 BOTTLE | Refills: 0 | Status: SHIPPED | OUTPATIENT
Start: 2020-09-23 | End: 2020-09-30 | Stop reason: HOSPADM

## 2020-09-23 NOTE — PROGRESS NOTES
Why does your doctor want you to have this procedure? Hx polyps      Do you have kidney disease?  no  If yes, are you on dialysis :     Have you had diverticulitis within the past 2 months? no    Are you diabetic?  no  If yes, insulin dependent: If yes, provide diabetic instructions sheet     Do take iron supplements?  no  If yes, instruct patient to hold iron supplement for 7 days prior    Are you on a blood thinner? no   Was the blood thinner sheet complete and faxed to cardiologist no  Plavix (clopidogrel), Coumadin (warfarin), Lovenox (enoxaparin), Xarelto (rivaroxaban), Pradaxa(dabigatran), Eliquis(apixaban) Savaysa/Lixiana (edoxapan)    Do you have an automatic implantable cardiac defibrillator (AICD)/pacemaker ([facility])? no  Was AICD/pacemaker sheet completed and faxed to cardiologist? no    Are you on home oxygen? no  If yes, continuous or nocturnal:     Have you been treated for MRSA, VRE or any communicable diseases? no    Heart attack, stroke, or stent within 3 months? no  Schedule at Hospital if within 3-6 months   Use nitroglycerin for chest pain in the last 6 months? no    History of organ  transplant?  no   If yes, notify Endo      History of neck/throat/tongue surgery or cancer? no  IF yes, notify Endo      Any problems with anesthesia in the past? Yes-nausea; pt requests zofran    Was stool C diff ordered?  no Stool specimen needs to be completed prior to procedure    Do have any facial or body piercings?no     Do you have a latex allergy? no     Do have an allergy to metals? (Bravo study only) no     If pediatric patient, was consent faxed to pediatrician no     Patient rights reviewed yes     Colon phone prep completed; clenpiq instructions emailed to pt; rx forwarded to provider

## 2020-09-30 ENCOUNTER — ANESTHESIA (OUTPATIENT)
Dept: GASTROENTEROLOGY | Facility: HOSPITAL | Age: 65
End: 2020-09-30

## 2020-09-30 ENCOUNTER — HOSPITAL ENCOUNTER (OUTPATIENT)
Dept: GASTROENTEROLOGY | Facility: HOSPITAL | Age: 65
Setting detail: OUTPATIENT SURGERY
Discharge: HOME | End: 2020-09-30
Attending: INTERNAL MEDICINE | Admitting: INTERNAL MEDICINE
Payer: OTHER GOVERNMENT

## 2020-09-30 ENCOUNTER — ANESTHESIA EVENT (OUTPATIENT)
Dept: GASTROENTEROLOGY | Facility: HOSPITAL | Age: 65
End: 2020-09-30

## 2020-09-30 VITALS
RESPIRATION RATE: 18 BRPM | OXYGEN SATURATION: 100 % | HEART RATE: 62 BPM | HEIGHT: 64 IN | SYSTOLIC BLOOD PRESSURE: 110 MMHG | TEMPERATURE: 97.8 F | WEIGHT: 130 LBS | BODY MASS INDEX: 22.2 KG/M2 | DIASTOLIC BLOOD PRESSURE: 65 MMHG

## 2020-09-30 VITALS — HEART RATE: 58 BPM

## 2020-09-30 DIAGNOSIS — Z86.010 HISTORY OF COLON POLYPS: ICD-10-CM

## 2020-09-30 PROBLEM — I71.2 THORACIC AORTIC ANEURYSM WITHOUT RUPTURE (HCC): Status: ACTIVE | Noted: 2020-09-30

## 2020-09-30 PROBLEM — Z98.890 PONV (POSTOPERATIVE NAUSEA AND VOMITING): Status: ACTIVE | Noted: 2020-09-30

## 2020-09-30 PROBLEM — Z85.3 HISTORY OF LEFT BREAST CANCER: Status: ACTIVE | Noted: 2020-09-30

## 2020-09-30 PROBLEM — I71.20 THORACIC AORTIC ANEURYSM WITHOUT RUPTURE (HCC): Status: ACTIVE | Noted: 2020-09-30

## 2020-09-30 PROBLEM — R11.2 PONV (POSTOPERATIVE NAUSEA AND VOMITING): Status: ACTIVE | Noted: 2020-09-30

## 2020-09-30 PROCEDURE — G0105 COLORECTAL SCRN; HI RISK IND: HCPCS | Performed by: INTERNAL MEDICINE

## 2020-09-30 RX ORDER — ONDANSETRON 2 MG/ML
INJECTION INTRAMUSCULAR; INTRAVENOUS AS NEEDED
Status: DISCONTINUED | OUTPATIENT
Start: 2020-09-30 | End: 2020-09-30

## 2020-09-30 RX ORDER — PROPOFOL 10 MG/ML
INJECTION, EMULSION INTRAVENOUS AS NEEDED
Status: DISCONTINUED | OUTPATIENT
Start: 2020-09-30 | End: 2020-09-30

## 2020-09-30 RX ORDER — SODIUM CHLORIDE 9 MG/ML
75 INJECTION, SOLUTION INTRAVENOUS CONTINUOUS
Status: DISCONTINUED | OUTPATIENT
Start: 2020-09-30 | End: 2020-10-04 | Stop reason: HOSPADM

## 2020-09-30 RX ORDER — GLYCOPYRROLATE 0.2 MG/ML
INJECTION INTRAMUSCULAR; INTRAVENOUS AS NEEDED
Status: DISCONTINUED | OUTPATIENT
Start: 2020-09-30 | End: 2020-09-30

## 2020-09-30 RX ADMIN — SODIUM CHLORIDE 75 ML/HR: 0.9 INJECTION, SOLUTION INTRAVENOUS at 07:23

## 2020-09-30 RX ADMIN — PROPOFOL 50 MG: 10 INJECTION, EMULSION INTRAVENOUS at 08:23

## 2020-09-30 RX ADMIN — PROPOFOL 50 MG: 10 INJECTION, EMULSION INTRAVENOUS at 08:26

## 2020-09-30 RX ADMIN — ONDANSETRON 4 MG: 2 INJECTION INTRAMUSCULAR; INTRAVENOUS at 08:04

## 2020-09-30 RX ADMIN — PROPOFOL 50 MG: 10 INJECTION, EMULSION INTRAVENOUS at 08:11

## 2020-09-30 RX ADMIN — PROPOFOL 50 MG: 10 INJECTION, EMULSION INTRAVENOUS at 08:17

## 2020-09-30 RX ADMIN — GLYCOPYRROLATE 0.15 MG: 0.2 INJECTION, SOLUTION INTRAMUSCULAR; INTRAVENOUS at 08:22

## 2020-09-30 RX ADMIN — PROPOFOL 50 MG: 10 INJECTION, EMULSION INTRAVENOUS at 08:13

## 2020-09-30 NOTE — ANESTHESIA POSTPROCEDURE EVALUATION
Post-Op Assessment Note    CV Status:  Stable  Pain Score: 0    Pain management: adequate     Mental Status:  Alert and awake   Hydration Status:  Euvolemic   PONV Controlled:  Controlled   Airway Patency:  Patent      Post Op Vitals Reviewed: Yes      Staff: Anesthesiologist, CRNA         No complications documented      BP      Temp      Pulse     Resp      SpO2

## 2020-09-30 NOTE — ANESTHESIA PREPROCEDURE EVALUATION
Procedure:  COLONOSCOPY    Relevant Problems   ANESTHESIA   (+) PONV (postoperative nausea and vomiting)      CARDIO   (+) Thoracic aortic aneurysm without rupture (HCC)      NEURO/PSYCH   (+) History of left breast cancer        Physical Exam    Airway    Mallampati score: I  TM Distance: >3 FB  Neck ROM: full     Dental       Cardiovascular  Cardiovascular exam normal    Pulmonary  Pulmonary exam normal     Other Findings        Anesthesia Plan  ASA Score- 3     Anesthesia Type- IV sedation with anesthesia with ASA Monitors  Additional Monitors:   Airway Plan:           Plan Factors-    Chart reviewed  EKG reviewed  Existing labs reviewed  Patient summary reviewed  Induction- intravenous  Postoperative Plan-     Informed Consent- Anesthetic plan and risks discussed with patient  I personally reviewed this patient with the CRNA  Discussed and agreed on the Anesthesia Plan with the CRNA  Maikol Houston

## 2020-11-19 ENCOUNTER — HOSPITAL ENCOUNTER (OUTPATIENT)
Dept: BONE DENSITY | Facility: IMAGING CENTER | Age: 65
Discharge: HOME | End: 2020-11-19
Payer: OTHER GOVERNMENT

## 2020-11-19 DIAGNOSIS — Z13.820 ENCOUNTER FOR SCREENING FOR OSTEOPOROSIS: ICD-10-CM

## 2020-11-19 PROCEDURE — 77080 DXA BONE DENSITY AXIAL: CPT

## 2020-11-24 DIAGNOSIS — E78.5 DYSLIPIDEMIA: ICD-10-CM

## 2020-11-24 RX ORDER — PRAVASTATIN SODIUM 10 MG
10 TABLET ORAL DAILY
Qty: 90 TABLET | Refills: 3 | Status: SHIPPED | OUTPATIENT
Start: 2020-11-24 | End: 2022-01-31

## 2021-01-11 ENCOUNTER — HOSPITAL ENCOUNTER (OUTPATIENT)
Dept: RADIOLOGY | Facility: HOSPITAL | Age: 66
Discharge: HOME | End: 2021-01-11
Attending: FAMILY MEDICINE
Payer: OTHER GOVERNMENT

## 2021-01-11 ENCOUNTER — TRANSCRIBE ORDERS (OUTPATIENT)
Dept: ADMINISTRATIVE | Facility: HOSPITAL | Age: 66
End: 2021-01-11

## 2021-01-11 DIAGNOSIS — U07.1 DIARRHEA DUE TO SEVERE ACUTE RESPIRATORY SYNDROME CORONAVIRUS 2 (SARS-COV-2) INFECTION: Primary | ICD-10-CM

## 2021-01-11 DIAGNOSIS — R06.02 SHORTNESS OF BREATH: ICD-10-CM

## 2021-01-11 DIAGNOSIS — U07.1 DIARRHEA DUE TO SEVERE ACUTE RESPIRATORY SYNDROME CORONAVIRUS 2 (SARS-COV-2) INFECTION: ICD-10-CM

## 2021-01-11 DIAGNOSIS — A08.39 DIARRHEA DUE TO SEVERE ACUTE RESPIRATORY SYNDROME CORONAVIRUS 2 (SARS-COV-2) INFECTION: ICD-10-CM

## 2021-01-11 DIAGNOSIS — A08.39 DIARRHEA DUE TO SEVERE ACUTE RESPIRATORY SYNDROME CORONAVIRUS 2 (SARS-COV-2) INFECTION: Primary | ICD-10-CM

## 2021-01-11 PROCEDURE — 71046 X-RAY EXAM CHEST 2 VIEWS: CPT

## 2021-03-29 DIAGNOSIS — Z23 ENCOUNTER FOR IMMUNIZATION: ICD-10-CM

## 2021-04-01 ENCOUNTER — TELEPHONE (OUTPATIENT)
Dept: CARDIOLOGY CLINIC | Facility: CLINIC | Age: 66
End: 2021-04-01

## 2021-04-01 NOTE — TELEPHONE ENCOUNTER
Patient called because she has questions for Dr Obey Zaragoza but said a Nurse can call back if he is not available  Please call

## 2021-04-07 ENCOUNTER — IMMUNIZATIONS (OUTPATIENT)
Dept: FAMILY MEDICINE CLINIC | Facility: HOSPITAL | Age: 66
End: 2021-04-07

## 2021-04-07 DIAGNOSIS — Z23 ENCOUNTER FOR IMMUNIZATION: Primary | ICD-10-CM

## 2021-04-07 PROCEDURE — 91300 SARS-COV-2 / COVID-19 MRNA VACCINE (PFIZER-BIONTECH) 30 MCG: CPT

## 2021-04-07 PROCEDURE — 0001A SARS-COV-2 / COVID-19 MRNA VACCINE (PFIZER-BIONTECH) 30 MCG: CPT

## 2021-05-02 ENCOUNTER — IMMUNIZATIONS (OUTPATIENT)
Dept: FAMILY MEDICINE CLINIC | Facility: HOSPITAL | Age: 66
End: 2021-05-02

## 2021-05-02 DIAGNOSIS — Z23 ENCOUNTER FOR IMMUNIZATION: Primary | ICD-10-CM

## 2021-05-02 PROCEDURE — 0002A SARS-COV-2 / COVID-19 MRNA VACCINE (PFIZER-BIONTECH) 30 MCG: CPT

## 2021-05-02 PROCEDURE — 91300 SARS-COV-2 / COVID-19 MRNA VACCINE (PFIZER-BIONTECH) 30 MCG: CPT

## 2021-08-04 ENCOUNTER — TELEPHONE (OUTPATIENT)
Dept: CARDIOLOGY CLINIC | Facility: CLINIC | Age: 66
End: 2021-08-04

## 2021-08-04 NOTE — TELEPHONE ENCOUNTER
Pt called office requesting CT calcium score  Test she would like order to be placed  please advise

## 2021-08-09 DIAGNOSIS — I71.2 THORACIC AORTIC ANEURYSM WITHOUT RUPTURE (HCC): Primary | ICD-10-CM

## 2021-08-09 DIAGNOSIS — E78.5 DYSLIPIDEMIA: ICD-10-CM

## 2021-08-11 ENCOUNTER — HOSPITAL ENCOUNTER (OUTPATIENT)
Dept: CT IMAGING | Facility: HOSPITAL | Age: 66
Discharge: HOME | End: 2021-08-11
Attending: INTERNAL MEDICINE
Payer: COMMERCIAL

## 2021-08-11 DIAGNOSIS — E78.5 DYSLIPIDEMIA: ICD-10-CM

## 2021-08-11 DIAGNOSIS — I71.2 THORACIC AORTIC ANEURYSM WITHOUT RUPTURE (HCC): ICD-10-CM

## 2021-08-11 PROCEDURE — 75571 CT HRT W/O DYE W/CA TEST: CPT

## 2021-08-11 PROCEDURE — G1004 CDSM NDSC: HCPCS

## 2021-08-20 ENCOUNTER — TELEPHONE (OUTPATIENT)
Dept: CARDIOLOGY CLINIC | Facility: CLINIC | Age: 66
End: 2021-08-20

## 2021-08-20 NOTE — TELEPHONE ENCOUNTER
Christo Wheeler MD  [facility] SPECIALTY [facility]  Looks good       Called & sw/Cady- advised of CT Calcium scoring results    She asked if we received her labs from July No- called DO Celia's office, requested copy  Faxing them CT calcium report  Also Alistair Hernandez wants to sw/Dr Candy Schafer because her PCP wants to increase Pravastatin to 20 mg

## 2021-09-02 ENCOUNTER — HOSPITAL ENCOUNTER (OUTPATIENT)
Dept: ULTRASOUND IMAGING | Facility: HOSPITAL | Age: 66
Discharge: HOME | End: 2021-09-02
Attending: FAMILY MEDICINE
Payer: OTHER GOVERNMENT

## 2021-09-02 DIAGNOSIS — R10.13 EPIGASTRIC PAIN: ICD-10-CM

## 2021-09-02 PROCEDURE — 76700 US EXAM ABDOM COMPLETE: CPT

## 2021-10-06 ENCOUNTER — TELEPHONE (OUTPATIENT)
Dept: CARDIOLOGY CLINIC | Facility: CLINIC | Age: 66
End: 2021-10-06

## 2021-10-06 RX ORDER — PRAVASTATIN SODIUM 20 MG
TABLET ORAL EVERY 24 HOURS
COMMUNITY
Start: 2021-09-01

## 2022-01-31 ENCOUNTER — OFFICE VISIT (OUTPATIENT)
Dept: CARDIOLOGY CLINIC | Facility: CLINIC | Age: 67
End: 2022-01-31
Payer: OTHER GOVERNMENT

## 2022-01-31 VITALS
HEIGHT: 64 IN | OXYGEN SATURATION: 100 % | BODY MASS INDEX: 23.8 KG/M2 | DIASTOLIC BLOOD PRESSURE: 64 MMHG | WEIGHT: 139.4 LBS | HEART RATE: 73 BPM | SYSTOLIC BLOOD PRESSURE: 116 MMHG

## 2022-01-31 DIAGNOSIS — I71.2 THORACIC AORTIC ANEURYSM WITHOUT RUPTURE (HCC): Primary | ICD-10-CM

## 2022-01-31 PROCEDURE — 99214 OFFICE O/P EST MOD 30 MIN: CPT | Performed by: INTERNAL MEDICINE

## 2022-01-31 PROCEDURE — 93000 ELECTROCARDIOGRAM COMPLETE: CPT | Performed by: INTERNAL MEDICINE

## 2022-01-31 RX ORDER — LORATADINE 10 MG/1
10 TABLET ORAL DAILY
COMMUNITY

## 2022-01-31 NOTE — PROGRESS NOTES
Cardiology Follow Up    Young Perkins  1955  [id number]  [facility] [address] [address]  [phone]    1  Thoracic aortic aneurysm without rupture (HCC)  POCT ECG       Interval History: Followup for thoracic aortic aneurysm    She is doing well  We reviewed her coronary calcium score  She has no chest pain, dyspnea or palpitations  Medical Problems             Problem List     History of left breast cancer    PONV (postoperative nausea and vomiting)    Thoracic aortic aneurysm without rupture [facility])              Past Medical History:   Diagnosis Date    Arthritis of neck     Cancer ([facility] [address] )     breast cancer with double mastectomy    Colon polyp     Limb alert care status     RESTRICTED LEFT ARM USE DUE TO BREAST CANCER    Migraine     Pancreatitis     Vertigo      Social History     Socioeconomic History    Marital status:       Spouse name: Not on file    Number of children: Not on file    Years of education: Not on file    Highest education level: Not on file   Occupational History    Not on file   Tobacco Use    Smoking status: Never Smoker    Smokeless tobacco: Never Used   Vaping Use    Vaping Use: Never used   Substance and Sexual Activity    Alcohol use: No    Drug use: No    Sexual activity: Not on file   Other Topics Concern    Not on file   Social History Narrative    Not on file     Social Determinants of Health     Financial Resource Strain: Not on file   Food Insecurity: Not on file   Transportation Needs: Not on file   Physical Activity: Not on file   Stress: Not on file   Social Connections: Not on file   Intimate Partner Violence: Not on file   Housing Stability: Not on file      Family History   Problem Relation Age of Onset    Colon cancer Paternal Grandfather      Past Surgical History:   Procedure Laterality Date    BREAST BIOPSY      BREAST RECONSTRUCTION Bilateral     COLONOSCOPY      KNEE SURGERY  2012    athroscopic    MASTECTOMY, RADICAL Bilateral        Current Outpatient Medications:     alendronate (FOSAMAX) 70 mg tablet, Take 70 mg by mouth Once a week, Disp: , Rfl:     aspirin 81 mg chewable tablet, Chew 81 mg daily, Disp: , Rfl:     ROGELIO SEED PO, Take by mouth daily, Disp: , Rfl:     loratadine (CLARITIN) 10 mg tablet, Take 10 mg by mouth daily, Disp: , Rfl:     Multiple Vitamin (MULTI VITAMIN DAILY PO), Take 1 capsule by mouth, Disp: , Rfl:     pravastatin (PRAVACHOL) 20 mg tablet, every 24 hours, Disp: , Rfl:   Allergies   Allergen Reactions    Ofloxacin      Other reaction(s): severe dizziness    Other      Peaches with red pit     Penicillins Hives    Sulfa Antibiotics Hives       Labs:     Chemistry        Component Value Date/Time    K 3 7 07/28/2020 0920     07/28/2020 0920    CO2 30 07/28/2020 0920    BUN 21 07/28/2020 0920    CREATININE 0 66 07/28/2020 0920        Component Value Date/Time    CALCIUM 9 2 07/28/2020 0920    ALKPHOS 62 05/24/2016 1011    AST 14 05/24/2016 1011    ALT 20 05/24/2016 1011            No results found for: CHOL  Lab Results   Component Value Date    HDL 78 09/18/2019     Lab Results   Component Value Date    LDLCALC 106 (H) 09/18/2019     Lab Results   Component Value Date    TRIG 66 09/18/2019     No results found for: CHOLHDL    Imaging: No results found  EKG: NSR Normal ECG     Review of Systems   Constitutional: Negative  HENT: Negative  Eyes: Negative  Cardiovascular: Negative  Respiratory: Negative  Endocrine: Negative  Hematologic/Lymphatic: Negative  Skin: Negative  Musculoskeletal: Negative  Gastrointestinal: Negative  Genitourinary: Negative  Neurological: Negative  Psychiatric/Behavioral: Negative  Allergic/Immunologic: Negative          Vitals:    01/31/22 1110   BP: 116/64   Pulse: 73   SpO2: 100%           Physical Exam  Vitals and nursing note reviewed  Constitutional:       Appearance: Normal appearance  HENT:      Head: Normocephalic  Nose: Nose normal       Mouth/Throat:      Mouth: Mucous membranes are moist    Eyes:      General: No scleral icterus  Conjunctiva/sclera: Conjunctivae normal    Cardiovascular:      Rate and Rhythm: Normal rate and regular rhythm  Heart sounds: No murmur heard  No gallop  Pulmonary:      Effort: Pulmonary effort is normal  No respiratory distress  Breath sounds: Normal breath sounds  No wheezing or rales  Abdominal:      General: Abdomen is flat  Bowel sounds are normal  There is no distension  Palpations: Abdomen is soft  Tenderness: There is no abdominal tenderness  There is no guarding  Musculoskeletal:      Cervical back: Normal range of motion and neck supple  Right lower leg: No edema  Left lower leg: No edema  Skin:     General: Skin is warm and dry  Neurological:      General: No focal deficit present  Mental Status: She is alert and oriented to person, place, and time  Psychiatric:         Mood and Affect: Mood normal          Behavior: Behavior normal          Discussion/Summary:    Thoracic Aortic Aneurysm: She is asymptomatic  Last measurement was 4 3 cm  We will continue to monitor       Palpitations: She is doing well and asymptomatic          Dyslipidemia: Continue pravastatin   and    Her coronary calcium score was zero                The patient was counseled regarding diagnostic results, instructions for management, risk factor reductions, impressions  total time of encounter was 25 minutes and 15 minutes was spent counseling

## 2022-05-18 LAB — EXT SARS-COV-2: NEGATIVE

## 2022-05-19 ENCOUNTER — EVALUATION (OUTPATIENT)
Dept: PHYSICAL THERAPY | Facility: CLINIC | Age: 67
End: 2022-05-19
Payer: OTHER GOVERNMENT

## 2022-05-19 DIAGNOSIS — G44.86 CERVICOGENIC HEADACHE: Primary | ICD-10-CM

## 2022-05-19 PROCEDURE — 97140 MANUAL THERAPY 1/> REGIONS: CPT | Performed by: PHYSICAL THERAPIST

## 2022-05-19 PROCEDURE — 97161 PT EVAL LOW COMPLEX 20 MIN: CPT | Performed by: PHYSICAL THERAPIST

## 2022-05-19 PROCEDURE — 97110 THERAPEUTIC EXERCISES: CPT | Performed by: PHYSICAL THERAPIST

## 2022-05-19 NOTE — PROGRESS NOTES
PT Evaluation     Today's date: 2022  Patient name: Kennedy Becerra  : 1955  MRN: [id number]  Referring provider: Christopher Yoon DO  Dx:   Encounter Diagnosis     ICD-10-CM    1  Cervicogenic headache  G44 86                   Assessment  Assessment details: Pt presents with reports of nystagmus without vertigo as well as HA with insidious onset 3 days ago  Pt tested negative for BPPV in all canals, treated for cervicogenic HA and dizziness  Pt educated on BPPV signs and symptoms and encouraged to reach out to care team regarding any future symptom exacerbation  Impairments: activity intolerance  Understanding of Dx/Px/POC: good   Prognosis: good    Goals  Abolish HA    Plan  Patient would benefit from: skilled physical therapy  Planned modality interventions: thermotherapy: hydrocollator packs  Planned therapy interventions: neuromuscular re-education, manual therapy and therapeutic exercise  Duration in visits: 2  Treatment plan discussed with: patient        Subjective Evaluation    History of Present Illness  Date of onset: 2022  Mechanism of injury: Pt reports Monday night feeling movement in her eyes however no vertigo sxs reported  Pt states she has had BPPV before and wishes to stay ahead of it this time  Pt is traveling out of country in 3 days  Pt also reports suboccipital HA that has lasted since onset of these sxs  Quality of life: good    Pain  Current pain ratin  Location: suboccipital region  Quality: dull ache    Patient Goals  Patient goals for therapy: decreased pain          Objective     Palpation   Left   Tenderness of the suboccipitals  Trigger point to cervical paraspinals  Right   Tenderness of the cervical paraspinals and suboccipitals  Neuro Exam:     Dizziness  Negative for disequilibrium, vertigo, oscillopsia, motion sickness, light-headedness, rocking or swaying, diplopia and floating or swimming       Exacerbating factors  Positive for supine to/from sitting  Negative for bending over, rolling in bed, looking up and turning head       Symptoms   Duration: 3 sec  Frequency: infrequent  Average intensity: 5/10    Headaches   Duration: 3 days  Average intensity: 5/10  Location: suboccipital region    Cervical exam   Modified VBI   Left: asymptomatic  Right: symptomatic    Positional testing   Brooke-Hallpike   Left posterior canal: WNL  Right posterior canal: WNL  Roll test   Left horizontal canal: WNL  Right horizontal canal: WNL             EPOC: 5/20/22      Manuals 5/19            Cervical PROM NS            STM cervical paraspinals NS            SOR NS                         Neuro Re-Ed                                                                                                        Ther Ex             UT stretch 10x10"            Cervical flexion stretch 10x10"                                                                                          Ther Activity                                       Gait Training                                       Modalities

## 2022-05-20 ENCOUNTER — OFFICE VISIT (OUTPATIENT)
Dept: PHYSICAL THERAPY | Facility: CLINIC | Age: 67
End: 2022-05-20
Payer: OTHER GOVERNMENT

## 2022-05-20 DIAGNOSIS — G44.86 CERVICOGENIC HEADACHE: Primary | ICD-10-CM

## 2022-05-20 PROCEDURE — 97110 THERAPEUTIC EXERCISES: CPT | Performed by: PHYSICAL THERAPIST

## 2022-05-20 PROCEDURE — 97140 MANUAL THERAPY 1/> REGIONS: CPT | Performed by: PHYSICAL THERAPIST

## 2022-05-20 NOTE — PROGRESS NOTES
Daily Note     Today's date: 2022  Patient name: Niels Rodgers  : 1955  MRN: [id number]  Referring provider: Slime Tran DO  Dx:   Encounter Diagnosis     ICD-10-CM    1  Cervicogenic headache  G44 86             Subjective: improved sxs following IE      Objective: See treatment diary below      Assessment: Tolerated treatment well  Patient demonstrated fatigue post treatment and would benefit from continued PT  Added chin tucks and cervical flexion stretching to HEP, see handout  Plan: Progress treatment as tolerated    Assess HEP technique & effects of implementation      EPOC: 22      Manuals            Cervical PROM NS NS           STM cervical paraspinals NS NS           SOR NS NS                        Neuro Re-Ed             Chin tuck iso  10x10"                                                                                         Ther Ex             UT stretch 10x10" 10x10"           Cervical flexion stretch 10x10" 10x10"                                                                                         Ther Activity                                       Gait Training                                       Modalities

## 2023-05-03 ENCOUNTER — OFFICE VISIT (OUTPATIENT)
Dept: CARDIOLOGY CLINIC | Facility: CLINIC | Age: 68
End: 2023-05-03

## 2023-05-03 VITALS
HEIGHT: 63 IN | HEART RATE: 69 BPM | WEIGHT: 132.2 LBS | SYSTOLIC BLOOD PRESSURE: 120 MMHG | BODY MASS INDEX: 23.42 KG/M2 | DIASTOLIC BLOOD PRESSURE: 78 MMHG

## 2023-05-03 DIAGNOSIS — E78.5 DYSLIPIDEMIA: ICD-10-CM

## 2023-05-03 DIAGNOSIS — I71.20 THORACIC AORTIC ANEURYSM WITHOUT RUPTURE, UNSPECIFIED PART (HCC): Primary | ICD-10-CM

## 2023-05-03 RX ORDER — ZINC GLUCONATE 50 MG
TABLET ORAL
COMMUNITY
Start: 2022-06-17

## 2023-05-03 RX ORDER — ASPIRIN 81 MG
TABLET, DELAYED RELEASE (ENTERIC COATED) ORAL
COMMUNITY
Start: 2021-01-01

## 2023-05-03 RX ORDER — GUAIFENESIN 1200 MG/1
TABLET, EXTENDED RELEASE ORAL
COMMUNITY
Start: 2023-04-12

## 2023-05-03 RX ORDER — DEXTROMETHORPHAN HBR, GUAIFENESIN 20; 200 MG/10ML; MG/10ML
SOLUTION ORAL
COMMUNITY
Start: 2023-04-12

## 2023-05-03 NOTE — PROGRESS NOTES
Cardiology Follow Up    Gertrudis Geena  1955  [id number]  [facility] Brandon Hernandez  [address] [place], [place] [id number] [phone]    1  Thoracic aortic aneurysm without rupture, unspecified part ([place] [address] )  POCT ECG      2  Dyslipidemia  POCT ECG          Interval History: Followup for hyperlipidemia and thoracic ascending aneurysm    Doing well  No chest pain, dyspnea or palpitations  Medical Problems     Problem List     History of left breast cancer    PONV (postoperative nausea and vomiting)    Thoracic aortic aneurysm without rupture [facility])        Past Medical History:   Diagnosis Date    Arthritis of neck     Cancer ([place] [address] )     breast cancer with double mastectomy    Colon polyp     Limb alert care status     RESTRICTED LEFT ARM USE DUE TO BREAST CANCER    Migraine     Pancreatitis     Vertigo      Social History     Socioeconomic History    Marital status:       Spouse name: Not on file    Number of children: Not on file    Years of education: Not on file    Highest education level: Not on file   Occupational History    Not on file   Tobacco Use    Smoking status: Never    Smokeless tobacco: Never   Vaping Use    Vaping Use: Never used   Substance and Sexual Activity    Alcohol use: No    Drug use: No    Sexual activity: Not on file   Other Topics Concern    Not on file   Social History Narrative    Not on file     Social Determinants of Health     Financial Resource Strain: Not on file   Food Insecurity: Not on file   Transportation Needs: Not on file   Physical Activity: Not on file   Stress: Not on file   Social Connections: Not on file   Intimate Partner Violence: Not on file   Housing Stability: Not on file      Family History   Problem Relation Age of Onset    Colon cancer Paternal Grandfather      Past Surgical History:   Procedure Laterality Date    BREAST BIOPSY      BREAST RECONSTRUCTION Bilateral     COLONOSCOPY      KNEE SURGERY  2012    athroscopic    MASTECTOMY, RADICAL Bilateral        Current Outpatient Medications:     Albuterol Sulfate 108 (90 Base) MCG/ACT AEPB, , Disp: , Rfl:     alendronate (FOSAMAX) 70 mg tablet, Take 70 mg by mouth Once a week, Disp: , Rfl:     aspirin 81 mg chewable tablet, Chew 81 mg daily, Disp: , Rfl:     ROGELIO SEED PO, Take by mouth daily, Disp: , Rfl:     Cholecalciferol 50 MCG (2000 UT) CAPS, , Disp: , Rfl:     dextromethorphan-guaiFENesin (Tussin DM Cough + Chest)  mg/5 mL oral liquid, , Disp: , Rfl:     Docusate Sodium 100 MG capsule, , Disp: , Rfl:     Guaifenesin 1200 MG TB12, , Disp: , Rfl:     loratadine (CLARITIN) 10 mg tablet, Take 10 mg by mouth daily, Disp: , Rfl:     Multiple Vitamin (MULTI VITAMIN DAILY PO), Take 1 capsule by mouth, Disp: , Rfl:     pravastatin (PRAVACHOL) 20 mg tablet, every 24 hours, Disp: , Rfl:     Zinc 50 MG TABS, , Disp: , Rfl:   Allergies   Allergen Reactions    Ofloxacin      Other reaction(s): severe dizziness    Other      Peaches with red pit     Penicillins Hives    Sulfa Antibiotics Hives       Labs:     Chemistry        Component Value Date/Time    K 3 7 07/28/2020 0920     07/28/2020 0920    CO2 30 07/28/2020 0920    BUN 21 07/28/2020 0920    CREATININE 0 66 07/28/2020 0920        Component Value Date/Time    CALCIUM 9 2 07/28/2020 0920    ALKPHOS 62 05/24/2016 1011    AST 14 05/24/2016 1011    ALT 20 05/24/2016 1011            No results found for: CHOL  Lab Results   Component Value Date    HDL 78 09/18/2019     Lab Results   Component Value Date    LDLCALC 106 (H) 09/18/2019     Lab Results   Component Value Date    TRIG 66 09/18/2019     No results found for: CHOLHDL    Imaging: No results found  Review of Systems   Constitutional: Negative  HENT: Negative  Eyes: Negative  Cardiovascular: Negative  Respiratory: Negative  Endocrine: Negative  Hematologic/Lymphatic: Negative  Skin: Negative  Musculoskeletal: Negative  Gastrointestinal: Negative  Genitourinary: Negative  Neurological: Negative  Psychiatric/Behavioral: Negative  Allergic/Immunologic: Negative  Vitals:    05/03/23 0847   BP: 120/78   Pulse: 69           Physical Exam  Vitals and nursing note reviewed  Constitutional:       Appearance: Normal appearance  HENT:      Head: Normocephalic  Nose: Nose normal       Mouth/Throat:      Mouth: Mucous membranes are moist    Eyes:      General: No scleral icterus  Conjunctiva/sclera: Conjunctivae normal    Cardiovascular:      Rate and Rhythm: Normal rate and regular rhythm  Heart sounds: No murmur heard  No gallop  Pulmonary:      Effort: Pulmonary effort is normal  No respiratory distress  Breath sounds: Normal breath sounds  No wheezing or rales  Abdominal:      General: Abdomen is flat  Bowel sounds are normal  There is no distension  Palpations: Abdomen is soft  Tenderness: There is no abdominal tenderness  There is no guarding  Musculoskeletal:      Cervical back: Normal range of motion and neck supple  Right lower leg: No edema  Left lower leg: No edema  Skin:     General: Skin is warm and dry  Neurological:      General: No focal deficit present  Mental Status: She is alert and oriented to person, place, and time  Psychiatric:         Mood and Affect: Mood normal          Behavior: Behavior normal          Discussion/Summary:    Thoracic Aortic Aneurysm: She is asymptomatic  Last measurement was 4 3 cm  Update echocardiogram      Lifescreen reviewed  Mild carotid athero, AAA normal, JOSH normal and HS CRP normal       Palpitations: She is doing well and asymptomatic       Dyslipidemia: Continue pravastatin   and    Her coronary calcium score was zero         The patient was counseled regarding diagnostic results, instructions for management, risk factor reductions, impressions  total time of encounter was 25 minutes and 15 minutes was spent counseling

## 2023-05-22 ENCOUNTER — HOSPITAL ENCOUNTER (OUTPATIENT)
Dept: NON INVASIVE DIAGNOSTICS | Age: 68
Discharge: HOME | End: 2023-05-22

## 2023-05-22 VITALS
DIASTOLIC BLOOD PRESSURE: 66 MMHG | BODY MASS INDEX: 23.39 KG/M2 | SYSTOLIC BLOOD PRESSURE: 108 MMHG | WEIGHT: 132 LBS | HEIGHT: 63 IN | HEART RATE: 75 BPM

## 2023-05-22 DIAGNOSIS — I71.20 THORACIC AORTIC ANEURYSM WITHOUT RUPTURE, UNSPECIFIED PART (HCC): ICD-10-CM

## 2023-05-22 LAB
AORTIC ROOT: 3.8 CM
APICAL FOUR CHAMBER EJECTION FRACTION: 57 %
ASCENDING AORTA: 4.4 CM
DOP CALC LVOT AREA: 3.46 CM2
DOP CALC LVOT DIAMETER: 2.1 CM
E WAVE DECELERATION TIME: 259 MS
FRACTIONAL SHORTENING: 29 % (ref 28–44)
INTERVENTRICULAR SEPTUM IN DIASTOLE (PARASTERNAL SHORT AXIS VIEW): 0.8 CM
INTERVENTRICULAR SEPTUM: 0.8 CM (ref 0.6–1.1)
LEFT ATRIUM AREA SYSTOLE SINGLE PLANE A4C: 11.9 CM2
LEFT ATRIUM SIZE: 2.8 CM
LEFT INTERNAL DIMENSION IN SYSTOLE: 3 CM (ref 2.1–4)
LEFT VENTRICULAR INTERNAL DIMENSION IN DIASTOLE: 4.2 CM (ref 3.5–6)
LEFT VENTRICULAR POSTERIOR WALL IN END DIASTOLE: 0.9 CM
LEFT VENTRICULAR STROKE VOLUME: 42 ML
LVSV (TEICH): 42 ML
MV E'TISSUE VEL-SEP: 8 CM/S
MV PEAK A VEL: 0.64 M/S
MV PEAK E VEL: 48 CM/S
MV STENOSIS PRESSURE HALF TIME: 75 MS
MV VALVE AREA P 1/2 METHOD: 2.93 CM2
RA PRESSURE ESTIMATED: 3 MMHG
RIGHT ATRIUM AREA SYSTOLE A4C: 13.8 CM2
RIGHT VENTRICLE ID DIMENSION: 2.9 CM
SL CV LV EF: 55
SL CV PED ECHO LEFT VENTRICLE DIASTOLIC VOLUME (MOD BIPLANE) 2D: 78 ML
SL CV PED ECHO LEFT VENTRICLE SYSTOLIC VOLUME (MOD BIPLANE) 2D: 36 ML
TRICUSPID ANNULAR PLANE SYSTOLIC EXCURSION: 1.5 CM

## 2024-01-17 ENCOUNTER — TELEPHONE (OUTPATIENT)
Dept: CARDIOLOGY CLINIC | Facility: CLINIC | Age: 69
End: 2024-01-17

## 2024-01-17 NOTE — TELEPHONE ENCOUNTER
Spoke with pt. Episode occurred yesterday evening from approx 21:30-23:00. Fluttering and pounding. Felt rapid- did not check pulse.Reports no other cardiac sx occurred. Pt felt urge to urinate 3 times during episode- states that is unusual for her. Had wine that evening. Had some caffeine during the day. She states she did not have any excessive amount and in the past she had no reactions to alcohol or caffeine. Pt is in [place] and is to drive back tomorrow. Would like to know if you feel this is OK for her to do and what she should do if this happens again. Please advise. Thank you.

## 2024-01-17 NOTE — TELEPHONE ENCOUNTER
"Patient is out of state and experienced an \"episode\" of heart fluttering that lasted over 2+ hours.  Patient is concerned as this has never happened for this long, it ususally lasts a few minutes.  Please Advise  "

## 2024-01-18 NOTE — TELEPHONE ENCOUNTER
Relayed msg as given. Pt verbalized understanding. Will call back to make follow up when she gets home.

## 2025-06-20 ENCOUNTER — HOSPITAL ENCOUNTER (OUTPATIENT)
Dept: HOSPITAL 99 - SDSPAT | Age: 70
End: 2025-06-20
Payer: MEDICARE

## 2025-06-20 DIAGNOSIS — Z01.818: Primary | ICD-10-CM

## 2025-06-20 LAB
BUN SERPL-MCNC: 24 MG/DL (ref 7–17)
CALCIUM SERPL-MCNC: 9.9 MG/DL (ref 8.4–10.2)
CHLORIDE SERPL-SCNC: 107 MMOL/L (ref 98–107)
CO2 SERPL-SCNC: 30 MMOL/L (ref 22–30)
COMMENT: (no result)
CREAT SERPL-MCNC: 0.6 MG/DL (ref 0.6–1)
EGFR: > 60
ERYTHROCYTE [DISTWIDTH] IN BLOOD BY AUTOMATED COUNT: 13.7 % (ref 11.5–14.5)
ESTIMATED CREATININE CLEARANCE: (no result) ML/MIN
GLUCOSE SERPL-MCNC: 93 MG/DL (ref 70–99)
HCT VFR BLD AUTO: 41.6 % (ref 37–47)
HGB BLD-MCNC: 13.7 G/DL (ref 12–16)
MCH RBC QN AUTO: 29.9 PG (ref 27–31)
MCHC RBC AUTO-ENTMCNC: 32.9 G/DL (ref 33–37)
MCV RBC AUTO: 90.8 FL (ref 81–99)
PLATELET # BLD AUTO: 218 10^3/UL (ref 130–400)
PMV BLD AUTO: 10.1 FL (ref 7.4–10.4)
POTASSIUM SERPL-SCNC: 4.9 MMOL/L (ref 3.5–5.1)
RBC # BLD AUTO: 4.58 10^6/UL (ref 4.2–5.4)
SODIUM SERPL-SCNC: 142 MMOL/L (ref 135–145)
WBC # BLD AUTO: 6.4 10^3/UL (ref 4.8–10.8)

## 2025-07-01 ENCOUNTER — HOSPITAL ENCOUNTER (OUTPATIENT)
Dept: HOSPITAL 99 - SDS | Age: 70
Discharge: HOME | End: 2025-07-01
Payer: MEDICARE

## 2025-07-01 VITALS — DIASTOLIC BLOOD PRESSURE: 69 MMHG

## 2025-07-01 VITALS — DIASTOLIC BLOOD PRESSURE: 63 MMHG | SYSTOLIC BLOOD PRESSURE: 115 MMHG

## 2025-07-01 VITALS — DIASTOLIC BLOOD PRESSURE: 67 MMHG | SYSTOLIC BLOOD PRESSURE: 123 MMHG

## 2025-07-01 VITALS — SYSTOLIC BLOOD PRESSURE: 132 MMHG | DIASTOLIC BLOOD PRESSURE: 87 MMHG

## 2025-07-01 VITALS — SYSTOLIC BLOOD PRESSURE: 132 MMHG | OXYGEN SATURATION: 1 % | DIASTOLIC BLOOD PRESSURE: 87 MMHG

## 2025-07-01 VITALS — DIASTOLIC BLOOD PRESSURE: 66 MMHG

## 2025-07-01 VITALS — BODY MASS INDEX: 22.8 KG/M2

## 2025-07-01 VITALS — DIASTOLIC BLOOD PRESSURE: 62 MMHG | SYSTOLIC BLOOD PRESSURE: 124 MMHG

## 2025-07-01 VITALS — SYSTOLIC BLOOD PRESSURE: 112 MMHG | DIASTOLIC BLOOD PRESSURE: 62 MMHG

## 2025-07-01 VITALS — OXYGEN SATURATION: 2 %

## 2025-07-01 VITALS — DIASTOLIC BLOOD PRESSURE: 60 MMHG | SYSTOLIC BLOOD PRESSURE: 115 MMHG

## 2025-07-01 DIAGNOSIS — N83.8: ICD-10-CM

## 2025-07-01 DIAGNOSIS — Z82.49: ICD-10-CM

## 2025-07-01 DIAGNOSIS — N83.291: ICD-10-CM

## 2025-07-01 DIAGNOSIS — N83.292: ICD-10-CM

## 2025-07-01 DIAGNOSIS — Z88.0: ICD-10-CM

## 2025-07-01 DIAGNOSIS — D25.9: ICD-10-CM

## 2025-07-01 DIAGNOSIS — N80.03: ICD-10-CM

## 2025-07-01 DIAGNOSIS — N84.0: ICD-10-CM

## 2025-07-01 DIAGNOSIS — N36.41: ICD-10-CM

## 2025-07-01 DIAGNOSIS — Z90.13: ICD-10-CM

## 2025-07-01 DIAGNOSIS — Z88.2: ICD-10-CM

## 2025-07-01 DIAGNOSIS — Z88.1: ICD-10-CM

## 2025-07-01 DIAGNOSIS — Z86.79: ICD-10-CM

## 2025-07-01 DIAGNOSIS — Z85.3: ICD-10-CM

## 2025-07-01 DIAGNOSIS — N39.3: ICD-10-CM

## 2025-07-01 DIAGNOSIS — N81.2: Primary | ICD-10-CM

## 2025-07-01 PROCEDURE — 58571 TLH W/T/O 250 G OR LESS: CPT

## 2025-07-01 PROCEDURE — C1713 ANCHOR/SCREW BN/BN,TIS/BN: HCPCS

## 2025-07-01 PROCEDURE — 57260 CMBN ANT PST COLPRHY: CPT

## 2025-07-01 PROCEDURE — 57283 COLPOPEXY INTRAPERITONEAL: CPT

## 2025-07-01 RX ADMIN — HEPARIN SODIUM 5000 UNITS: 5000 INJECTION, SOLUTION INTRAVENOUS; SUBCUTANEOUS at 09:48

## 2025-07-01 RX ADMIN — PROCHLORPERAZINE EDISYLATE 5 MG: 5 INJECTION INTRAMUSCULAR; INTRAVENOUS at 16:11

## 2025-07-01 RX ADMIN — ONDANSETRON HYDROCHLORIDE 4 MG: 2 SOLUTION INTRAMUSCULAR; INTRAVENOUS at 15:11

## 2025-07-01 RX ADMIN — SODIUM CHLORIDE, SODIUM ACETATE ANHYDROUS, SODIUM GLUCONATE, POTASSIUM CHLORIDE, AND MAGNESIUM CHLORIDE 1000: 526; 222; 502; 37; 30 INJECTION, SOLUTION INTRAVENOUS at 09:53

## 2025-07-01 RX ADMIN — PHENAZOPYRIDINE 200 MG: 200 TABLET ORAL at 09:48
